# Patient Record
Sex: MALE | Race: WHITE | NOT HISPANIC OR LATINO | Employment: STUDENT | URBAN - METROPOLITAN AREA
[De-identification: names, ages, dates, MRNs, and addresses within clinical notes are randomized per-mention and may not be internally consistent; named-entity substitution may affect disease eponyms.]

---
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
## 2021-06-08 NOTE — PROGRESS NOTES
Daily Note     Today's date: 2018  Patient name: Giselle Garcia  : 2000  MRN: [id number]  Referring provider: Foreign Ovalle  Dx:   Encounter Diagnosis     ICD-10-CM    1  Sprain of ligament of right ankle, subsequent encounter S93 401D    2  Acute right ankle pain M25 571                   Subjective: Patient reported noticing a mild improvement in R ankle symptoms over the last two days  Objective: See treatment diary below  Updated home program       Assessment: No reported increase in symptoms during program  Progressed LE strengthening and stability program to address functional weakness and instability, providing cueing for form  Concluded treatment with modalities for pain control and swelling  Plan: Continue per plan of care  Progress treatment as tolerated           Precautions: none    Daily Treatment Diary   Manual             Ankle DF PROM Prn/as tolerated NV                                                                   Exercise Diary             Bike warm up NV 10 min           Standing gastroc block stretch :30x3 30"x3           Standing soleus block stretch :30x3 30"x3           Seated IV/EV med ball NV 10"x 10 ea           SL stance on foam NV  10"x 10            VG DL squats NV 40%  4 min           VG SL heel raises NV 40%  2x15           Standing ankle 4-ways NV NV                                                                                                                                                                           Modalities             H-wave (high, low ) /CP/elevation NV 10 min post                                       HEP: standing gastroc block stretch, standing soleus stretch, SL stance, seated INV/EV iso
0

## 2023-08-01 ENCOUNTER — APPOINTMENT (OUTPATIENT)
Dept: LAB | Facility: CLINIC | Age: 23
End: 2023-08-01
Payer: COMMERCIAL

## 2023-08-01 ENCOUNTER — OFFICE VISIT (OUTPATIENT)
Dept: FAMILY MEDICINE CLINIC | Facility: CLINIC | Age: 23
End: 2023-08-01
Payer: COMMERCIAL

## 2023-08-01 VITALS
SYSTOLIC BLOOD PRESSURE: 128 MMHG | WEIGHT: 249 LBS | DIASTOLIC BLOOD PRESSURE: 62 MMHG | HEIGHT: 70 IN | HEART RATE: 72 BPM | BODY MASS INDEX: 35.65 KG/M2

## 2023-08-01 DIAGNOSIS — Z13.1 SCREENING FOR DIABETES MELLITUS: ICD-10-CM

## 2023-08-01 DIAGNOSIS — K21.9 GASTROESOPHAGEAL REFLUX DISEASE, UNSPECIFIED WHETHER ESOPHAGITIS PRESENT: ICD-10-CM

## 2023-08-01 DIAGNOSIS — Z13.220 SCREENING FOR LIPID DISORDERS: ICD-10-CM

## 2023-08-01 DIAGNOSIS — Z13.29 SCREENING FOR THYROID DISORDER: ICD-10-CM

## 2023-08-01 DIAGNOSIS — G44.019 EPISODIC CLUSTER HEADACHE, NOT INTRACTABLE: ICD-10-CM

## 2023-08-01 DIAGNOSIS — J30.1 SEASONAL ALLERGIC RHINITIS DUE TO POLLEN: ICD-10-CM

## 2023-08-01 DIAGNOSIS — Z00.00 ANNUAL PHYSICAL EXAM: Primary | ICD-10-CM

## 2023-08-01 DIAGNOSIS — Q23.1 BICUSPID AORTIC VALVE: ICD-10-CM

## 2023-08-01 PROBLEM — J30.9 ALLERGIC RHINITIS: Status: ACTIVE | Noted: 2018-12-05

## 2023-08-01 LAB
ALBUMIN SERPL BCP-MCNC: 4.3 G/DL (ref 3.5–5)
ALP SERPL-CCNC: 82 U/L (ref 46–116)
ALT SERPL W P-5'-P-CCNC: 75 U/L (ref 12–78)
ANION GAP SERPL CALCULATED.3IONS-SCNC: 5 MMOL/L
AST SERPL W P-5'-P-CCNC: 34 U/L (ref 5–45)
BACTERIA UR QL AUTO: ABNORMAL /HPF
BASOPHILS # BLD AUTO: 0.04 THOUSANDS/ÂΜL (ref 0–0.1)
BASOPHILS NFR BLD AUTO: 1 % (ref 0–1)
BILIRUB SERPL-MCNC: 0.46 MG/DL (ref 0.2–1)
BILIRUB UR QL STRIP: NEGATIVE
BUN SERPL-MCNC: 13 MG/DL (ref 5–25)
CALCIUM SERPL-MCNC: 9.6 MG/DL (ref 8.3–10.1)
CHLORIDE SERPL-SCNC: 106 MMOL/L (ref 96–108)
CHOLEST SERPL-MCNC: 140 MG/DL
CLARITY UR: ABNORMAL
CO2 SERPL-SCNC: 28 MMOL/L (ref 21–32)
COLOR UR: ABNORMAL
CREAT SERPL-MCNC: 1.13 MG/DL (ref 0.6–1.3)
EOSINOPHIL # BLD AUTO: 0.23 THOUSAND/ÂΜL (ref 0–0.61)
EOSINOPHIL NFR BLD AUTO: 3 % (ref 0–6)
ERYTHROCYTE [DISTWIDTH] IN BLOOD BY AUTOMATED COUNT: 12.4 % (ref 11.6–15.1)
FERRITIN SERPL-MCNC: 51 NG/ML (ref 24–336)
GFR SERPL CREATININE-BSD FRML MDRD: 91 ML/MIN/1.73SQ M
GLUCOSE SERPL-MCNC: 101 MG/DL (ref 65–140)
GLUCOSE UR STRIP-MCNC: NEGATIVE MG/DL
HCT VFR BLD AUTO: 45.7 % (ref 36.5–49.3)
HDLC SERPL-MCNC: 49 MG/DL
HGB BLD-MCNC: 14.5 G/DL (ref 12–17)
HGB UR QL STRIP.AUTO: NEGATIVE
IMM GRANULOCYTES # BLD AUTO: 0.02 THOUSAND/UL (ref 0–0.2)
IMM GRANULOCYTES NFR BLD AUTO: 0 % (ref 0–2)
IRON SATN MFR SERPL: 20 % (ref 20–50)
IRON SERPL-MCNC: 73 UG/DL (ref 65–175)
KETONES UR STRIP-MCNC: NEGATIVE MG/DL
LDLC SERPL CALC-MCNC: 78 MG/DL (ref 0–100)
LEUKOCYTE ESTERASE UR QL STRIP: NEGATIVE
LYMPHOCYTES # BLD AUTO: 1.69 THOUSANDS/ÂΜL (ref 0.6–4.47)
LYMPHOCYTES NFR BLD AUTO: 22 % (ref 14–44)
MAGNESIUM SERPL-MCNC: 2.6 MG/DL (ref 1.6–2.6)
MCH RBC QN AUTO: 27.4 PG (ref 26.8–34.3)
MCHC RBC AUTO-ENTMCNC: 31.7 G/DL (ref 31.4–37.4)
MCV RBC AUTO: 86 FL (ref 82–98)
MONOCYTES # BLD AUTO: 0.54 THOUSAND/ÂΜL (ref 0.17–1.22)
MONOCYTES NFR BLD AUTO: 7 % (ref 4–12)
MUCOUS THREADS UR QL AUTO: ABNORMAL
NEUTROPHILS # BLD AUTO: 5.08 THOUSANDS/ÂΜL (ref 1.85–7.62)
NEUTS SEG NFR BLD AUTO: 67 % (ref 43–75)
NITRITE UR QL STRIP: NEGATIVE
NON-SQ EPI CELLS URNS QL MICRO: ABNORMAL /HPF
NRBC BLD AUTO-RTO: 0 /100 WBCS
PH UR STRIP.AUTO: 6.5 [PH]
PHOSPHATE SERPL-MCNC: 2.8 MG/DL (ref 2.7–4.5)
PLATELET # BLD AUTO: 353 THOUSANDS/UL (ref 149–390)
PMV BLD AUTO: 9.7 FL (ref 8.9–12.7)
POTASSIUM SERPL-SCNC: 4.2 MMOL/L (ref 3.5–5.3)
PROT SERPL-MCNC: 8.2 G/DL (ref 6.4–8.4)
PROT UR STRIP-MCNC: ABNORMAL MG/DL
RBC # BLD AUTO: 5.3 MILLION/UL (ref 3.88–5.62)
RBC #/AREA URNS AUTO: ABNORMAL /HPF
SODIUM SERPL-SCNC: 139 MMOL/L (ref 135–147)
SP GR UR STRIP.AUTO: 1.03 (ref 1–1.03)
TIBC SERPL-MCNC: 374 UG/DL (ref 250–450)
TRIGL SERPL-MCNC: 63 MG/DL
TSH SERPL DL<=0.05 MIU/L-ACNC: 2.04 UIU/ML (ref 0.45–4.5)
UROBILINOGEN UR STRIP-ACNC: <2 MG/DL
WBC # BLD AUTO: 7.6 THOUSAND/UL (ref 4.31–10.16)
WBC #/AREA URNS AUTO: ABNORMAL /HPF

## 2023-08-01 PROCEDURE — 84100 ASSAY OF PHOSPHORUS: CPT

## 2023-08-01 PROCEDURE — 80053 COMPREHEN METABOLIC PANEL: CPT

## 2023-08-01 PROCEDURE — 81001 URINALYSIS AUTO W/SCOPE: CPT

## 2023-08-01 PROCEDURE — 83550 IRON BINDING TEST: CPT

## 2023-08-01 PROCEDURE — 36415 COLL VENOUS BLD VENIPUNCTURE: CPT

## 2023-08-01 PROCEDURE — 85025 COMPLETE CBC W/AUTO DIFF WBC: CPT

## 2023-08-01 PROCEDURE — 99385 PREV VISIT NEW AGE 18-39: CPT | Performed by: NURSE PRACTITIONER

## 2023-08-01 PROCEDURE — 80061 LIPID PANEL: CPT

## 2023-08-01 PROCEDURE — 83735 ASSAY OF MAGNESIUM: CPT

## 2023-08-01 PROCEDURE — 83540 ASSAY OF IRON: CPT

## 2023-08-01 PROCEDURE — 82728 ASSAY OF FERRITIN: CPT

## 2023-08-01 PROCEDURE — 84443 ASSAY THYROID STIM HORMONE: CPT

## 2023-08-01 PROCEDURE — 99204 OFFICE O/P NEW MOD 45 MIN: CPT | Performed by: NURSE PRACTITIONER

## 2023-08-01 RX ORDER — PANTOPRAZOLE SODIUM 40 MG/1
40 TABLET, DELAYED RELEASE ORAL
Qty: 30 TABLET | Refills: 1 | Status: SHIPPED | OUTPATIENT
Start: 2023-08-01

## 2023-08-01 RX ORDER — NAPROXEN 500 MG/1
500 TABLET ORAL 2 TIMES DAILY PRN
Qty: 60 TABLET | Refills: 0 | Status: SHIPPED | OUTPATIENT
Start: 2023-08-01

## 2023-08-01 RX ORDER — FAMOTIDINE 20 MG/1
20 TABLET, FILM COATED ORAL
COMMUNITY

## 2023-08-01 RX ORDER — LORATADINE 10 MG/1
10 TABLET ORAL DAILY
COMMUNITY

## 2023-08-01 RX ORDER — FAMOTIDINE 40 MG/1
40 TABLET, FILM COATED ORAL DAILY
Qty: 90 TABLET | Refills: 0 | Status: CANCELLED | OUTPATIENT
Start: 2023-08-01

## 2023-08-01 NOTE — ASSESSMENT & PLAN NOTE
Repeat echocardiogram was ordered for reassessment of this. Patient does currently follow with a cardiologist at [facility].

## 2023-08-01 NOTE — ASSESSMENT & PLAN NOTE
Patient's symptoms are consistent with uncontrolled GERD. Patient was advised to continue Pepcid 20 mg daily but was advised to begin taking the medication at bedtime. Patient was also started on Protonix 40 mg daily prior to breakfast.  I did speak with patient about the pathophysiology of GERD and advised him to avoid common trigger foods. Patient was also advised to make a food diary to hopefully identify trigger foods for his GERD. Patient was also advised to make sure that he sits upright for at least an hour after eating to prevent exacerbation of his symptoms. I will have patient return to the office in 1 month to reassess his symptoms.

## 2023-08-01 NOTE — ASSESSMENT & PLAN NOTE
I feel that it is very likely that the patient's recurrent headaches are due to hypoglycemia caused by the long gaps that the patient takes in between meals. Patient was advised that he should at least be eating a small snack for breakfast and lunch to prevent these episodes of hypoglycemia. Patient was also advised to keep an adequate water intake to prevent dehydration. Labs were also ordered to assess for any signs of acute dehydration or electrolyte imbalances which could be contributing to recurrent headaches. Naproxen was also ordered to be used as needed up to 2 times per day to treat headaches.

## 2023-08-01 NOTE — PROGRESS NOTES
[facility]    NAME: Mis Araiza  AGE: 26 y.o. SEX: male  : 2000     DATE: 2023     Assessment and Plan:     Problem List Items Addressed This Visit        Digestive    Gastroesophageal reflux disease     Patient's symptoms are consistent with uncontrolled GERD. Patient was advised to continue Pepcid 20 mg daily but was advised to begin taking the medication at bedtime. Patient was also started on Protonix 40 mg daily prior to breakfast.  I did speak with patient about the pathophysiology of GERD and advised him to avoid common trigger foods. Patient was also advised to make a food diary to hopefully identify trigger foods for his GERD. Patient was also advised to make sure that he sits upright for at least an hour after eating to prevent exacerbation of his symptoms. I will have patient return to the office in 1 month to reassess his symptoms. Relevant Medications    famotidine (PEPCID) 20 mg tablet    pantoprazole (PROTONIX) 40 mg tablet       Respiratory    Allergic rhinitis     Well-controlled with as needed use of Claritin. Relevant Medications    naproxen (Naprosyn) 500 mg tablet       Cardiovascular and Mediastinum    Bicuspid aortic valve     Repeat echocardiogram was ordered for reassessment of this. Patient does currently follow with a cardiologist at [facility]. Relevant Orders    Echo complete w/ contrast if indicated       Nervous and Auditory    Episodic cluster headache, not intractable     I feel that it is very likely that the patient's recurrent headaches are due to hypoglycemia caused by the long gaps that the patient takes in between meals. Patient was advised that he should at least be eating a small snack for breakfast and lunch to prevent these episodes of hypoglycemia. Patient was also advised to keep an adequate water intake to prevent dehydration.   Labs were also ordered to assess for any signs of acute dehydration or electrolyte imbalances which could be contributing to recurrent headaches. Naproxen was also ordered to be used as needed up to 2 times per day to treat headaches. Relevant Medications    naproxen (Naprosyn) 500 mg tablet    Other Relevant Orders    CBC and differential    Comprehensive metabolic panel    Iron Panel (Includes Ferritin, Iron Sat%, Iron, and TIBC)    Magnesium    Phosphorus   Other Visit Diagnoses     Annual physical exam    -  Primary    Screening for thyroid disorder        Relevant Orders    TSH, 3rd generation    Screening for lipid disorders        Relevant Orders    Lipid Panel with Direct LDL reflex    Screening for diabetes mellitus        Relevant Orders    UA w Reflex to Microscopic w Reflex to Culture -Lab Collect          Immunizations and preventive care screenings were discussed with patient today. Appropriate education was printed on patient's after visit summary. Counseling:  Alcohol/drug use: discussed moderation in alcohol intake, the recommendations for healthy alcohol use, and avoidance of illicit drug use. Dental Health: discussed importance of regular tooth brushing, flossing, and dental visits. Injury prevention: discussed safety/seat belts, safety helmets, smoke detectors, carbon dioxide detectors, and smoking near bedding or upholstery. Sexual health: discussed sexually transmitted diseases, partner selection, use of condoms, avoidance of unintended pregnancy, and contraceptive alternatives. Exercise: the importance of regular exercise/physical activity was discussed. Recommend exercise 3-5 times per week for at least 30 minutes. BMI Counseling: Body mass index is 35.73 kg/m². The BMI is above normal. Nutrition recommendations include decreasing portion sizes, encouraging healthy choices of fruits and vegetables, moderation in carbohydrate intake and increasing intake of lean protein.  Exercise recommendations include exercising 3-5 times per week and obtaining a gym membership. No pharmacotherapy was ordered. Rationale for BMI follow-up plan is due to patient being overweight or obese. Depression Screening and Follow-up Plan: Patient was screened for depression during today's encounter. They screened negative with a PHQ-2 score of 0. Return in about 4 weeks (around 8/29/2023) for Recheck. Chief Complaint:     Chief Complaint   Patient presents with   • Headache     Pt c/o headaches on theright side of head off and on x 10 days. • GERD     Pt c/o food getting stuck in throat at times makes pt jeffry. History of Present Illness:     Adult Annual Physical   Patient here for a comprehensive physical exam. The patient reports no problems. Allergic rhinitis: Well-controlled with as needed use of Claritin. Headaches: Patient reports that he has been having recurrent right-sided headaches over the past 10 days. The patient reports that these headaches have been occurring daily and last for about 30 minutes. He denies any associated neurological changes. Patient does report that he frequently goes long periods of time without eating and often eats his first meal at 9 PM each night. Patient does report that his headaches seem to improve after he does eat. GERD: Patient reports that he has been having recurrent episodes of a globus sensation over the past few weeks. The patient reports that frequently after eating he feels as though food is caught in his throat and he does regurgitate some of his food at times. Patient does report that he frequently eats late at night and often goes to bed right after eating. Patient did recently begin Pepcid 20 mg daily and he reports this did somewhat improve his symptoms but has not resolved the symptoms.     Bicuspid aortic valve: Patient's most recent echocardiogram was completed in 2019 and it showed a bicuspid aortic valve with fusion of the right and left coronary cusps. No aortic valve regurgitation or stenosis was noted. Normal left and right ventricular size were also noted. The patient denies any new cardiac symptoms. The patient is requesting that a repeat echocardigram be ordered for monitoring of his condition. Diet and Physical Activity  Diet/Nutrition: well balanced diet and consuming 3-5 servings of fruits/vegetables daily. Exercise: walking, 3-4 times a week on average and 30-60 minutes on average. Depression Screening  PHQ-2/9 Depression Screening    Little interest or pleasure in doing things: 0 - not at all  Feeling down, depressed, or hopeless: 0 - not at all  PHQ-2 Score: 0  PHQ-2 Interpretation: Negative depression screen       General Health  Sleep: sleeps well and gets 7-8 hours of sleep on average. Hearing: normal - bilateral.  Vision: no vision problems, goes for regular eye exams and most recent eye exam <1 year ago. Dental: regular dental visits, brushes teeth twice daily and flosses teeth occasionally.  Health  History of STDs?: no.     Review of Systems:     Review of Systems   Constitutional: Negative for chills and fever. HENT: Negative for ear pain and sore throat. Eyes: Negative for pain and visual disturbance. Respiratory: Negative for cough, chest tightness, shortness of breath and wheezing. Cardiovascular: Negative for chest pain, palpitations and leg swelling. Gastrointestinal: Negative for abdominal pain, constipation, diarrhea, nausea and vomiting. Recurrent globus sensation   Endocrine: Negative for cold intolerance and heat intolerance. Genitourinary: Negative for decreased urine volume, dysuria and hematuria. Musculoskeletal: Negative for arthralgias, back pain and myalgias. Skin: Negative for color change and rash. Allergic/Immunologic: Positive for environmental allergies. Neurological: Positive for headaches (recurrent headaches ).  Negative for dizziness, seizures, syncope, weakness, light-headedness and numbness. Hematological: Negative for adenopathy. Psychiatric/Behavioral: Negative for confusion. The patient is not nervous/anxious. All other systems reviewed and are negative. Past Medical History:     Past Medical History:   Diagnosis Date   • Bicuspid aortic valve       Past Surgical History:     Past Surgical History:   Procedure Laterality Date   • MOLE REMOVAL      on lip      Social History:     Social History     Socioeconomic History   • Marital status: Single     Spouse name: None   • Number of children: None   • Years of education: None   • Highest education level: None   Occupational History   • None   Tobacco Use   • Smoking status: Never   • Smokeless tobacco: Never   • Tobacco comments:     Pt does Vapping   Substance and Sexual Activity   • Alcohol use: None   • Drug use: None   • Sexual activity: None   Other Topics Concern   • None   Social History Narrative   • None     Social Determinants of Health     Financial Resource Strain: Not on file   Food Insecurity: Not on file   Transportation Needs: Not on file   Physical Activity: Not on file   Stress: Not on file   Social Connections: Not on file   Intimate Partner Violence: Not on file   Housing Stability: Not on file      Family History:     History reviewed. No pertinent family history. Current Medications:     Current Outpatient Medications   Medication Sig Dispense Refill   • famotidine (PEPCID) 20 mg tablet Take 20 mg by mouth daily at bedtime     • naproxen (Naprosyn) 500 mg tablet Take 1 tablet (500 mg total) by mouth 2 (two) times a day as needed for headaches 60 tablet 0   • pantoprazole (PROTONIX) 40 mg tablet Take 1 tablet (40 mg total) by mouth daily before breakfast 30 tablet 1   • loratadine (CLARITIN) 10 mg tablet Take 10 mg by mouth daily       No current facility-administered medications for this visit.       Allergies:     No Known Allergies   Physical Exam:     /62 Pulse 72   Ht 5' 10" (1.778 m)   Wt 113 kg (249 lb)   BMI 35.73 kg/m²     Physical Exam  Vitals and nursing note reviewed. Constitutional:       General: He is not in acute distress. Appearance: Normal appearance. He is well-developed. He is not ill-appearing. HENT:      Head: Normocephalic. Eyes:      Conjunctiva/sclera: Conjunctivae normal.   Cardiovascular:      Rate and Rhythm: Normal rate and regular rhythm. Pulses: Normal pulses. Carotid pulses are 2+ on the right side and 2+ on the left side. Radial pulses are 2+ on the right side and 2+ on the left side. Posterior tibial pulses are 2+ on the right side and 2+ on the left side. Heart sounds: Normal heart sounds. No murmur heard. Pulmonary:      Effort: Pulmonary effort is normal. No respiratory distress. Breath sounds: Normal breath sounds. No decreased breath sounds, wheezing, rhonchi or rales. Abdominal:      General: Abdomen is flat. Bowel sounds are normal. There is no distension. Palpations: Abdomen is soft. Tenderness: There is no abdominal tenderness. There is no guarding. Musculoskeletal:         General: No swelling. Normal range of motion. Cervical back: Normal range of motion and neck supple. Right lower leg: No edema. Left lower leg: No edema. Skin:     General: Skin is warm and dry. Capillary Refill: Capillary refill takes less than 2 seconds. Neurological:      General: No focal deficit present. Mental Status: He is alert and oriented to person, place, and time. Psychiatric:         Mood and Affect: Mood normal.         Behavior: Behavior normal.         Thought Content:  Thought content normal.         Judgment: Judgment normal.          STEPHANIE Keller   [facility]

## 2023-08-01 NOTE — PROGRESS NOTES
Name: Ed Sands      : 2000      MRN: [id number]  Encounter Provider: STEPHANIE Werner  Encounter Date: 2023   Encounter department: [address] [facility] [address]     {There are no diagnoses linked to this encounter.  (Refresh or delete this SmartLink)}       Subjective      HPI  Review of Systems    Current Outpatient Medications on File Prior to Visit   Medication Sig   • loratadine (CLARITIN) 10 mg tablet Take 10 mg by mouth daily       Objective     /62   Pulse 72   Ht 5' 10" (1.778 m)   Wt 113 kg (249 lb)   BMI 35.73 kg/m²     Physical Exam  STEPHANIE Werner

## 2023-08-02 ENCOUNTER — TELEPHONE (OUTPATIENT)
Dept: FAMILY MEDICINE CLINIC | Facility: CLINIC | Age: 23
End: 2023-08-02

## 2023-08-25 ENCOUNTER — RA CDI HCC (OUTPATIENT)
Dept: OTHER | Facility: HOSPITAL | Age: 23
End: 2023-08-25

## 2023-08-25 NOTE — PROGRESS NOTES
[address] [facility] coding opportunities       Chart reviewed, no opportunity found: CHART REVIEWED, NO OPPORTUNITY FOUND        Patients Insurance        Commercial Insurance: [employer]

## 2023-08-31 ENCOUNTER — OFFICE VISIT (OUTPATIENT)
Dept: FAMILY MEDICINE CLINIC | Facility: CLINIC | Age: 23
End: 2023-08-31

## 2023-08-31 VITALS
TEMPERATURE: 98.7 F | WEIGHT: 246.13 LBS | SYSTOLIC BLOOD PRESSURE: 120 MMHG | DIASTOLIC BLOOD PRESSURE: 80 MMHG | HEART RATE: 70 BPM | BODY MASS INDEX: 35.32 KG/M2 | OXYGEN SATURATION: 97 %

## 2023-08-31 DIAGNOSIS — U07.1 COVID-19 VIRUS INFECTION: Primary | ICD-10-CM

## 2023-08-31 DIAGNOSIS — K21.9 GASTROESOPHAGEAL REFLUX DISEASE, UNSPECIFIED WHETHER ESOPHAGITIS PRESENT: Primary | ICD-10-CM

## 2023-08-31 DIAGNOSIS — K21.9 GASTROESOPHAGEAL REFLUX DISEASE, UNSPECIFIED WHETHER ESOPHAGITIS PRESENT: ICD-10-CM

## 2023-08-31 DIAGNOSIS — J02.9 SORE THROAT: ICD-10-CM

## 2023-08-31 DIAGNOSIS — R09.81 CONGESTED NOSE: ICD-10-CM

## 2023-08-31 LAB
SARS-COV-2 AG UPPER RESP QL IA: POSITIVE
VALID CONTROL: ABNORMAL

## 2023-08-31 RX ORDER — FAMOTIDINE 40 MG/1
40 TABLET, FILM COATED ORAL
Qty: 30 TABLET | Refills: 2 | Status: SHIPPED | OUTPATIENT
Start: 2023-08-31

## 2023-08-31 RX ORDER — SUCRALFATE ORAL 1 G/10ML
1 SUSPENSION ORAL
Qty: 414 ML | Refills: 4 | Status: SHIPPED | OUTPATIENT
Start: 2023-08-31 | End: 2023-09-01

## 2023-08-31 NOTE — ASSESSMENT & PLAN NOTE
Patient's GERD symptoms have not improved much therefore, he was advised to continue Protonix 40 mg daily prior to breakfast and he will also be started on Carafate 3 times daily before meals. His Pepcid dosage was also increased to 40 mg at bedtime. Amylase and lipase levels were ordered to assess for any signs of pancreatitis. H. pylori stool sample was also ordered to be completed. GI referral was also placed so that patient can be scheduled for endoscopy. I also reinforced the importance of limiting fatty and fried foods in his diet.

## 2023-08-31 NOTE — ASSESSMENT & PLAN NOTE
Patient was advised to continue to quarantine through the rest of today and if he remains afebrile over the next 24 hours without the use of antipyretics he may return from isolation starting tomorrow and begin to mask for 5 days when in public and when around others. Patient was advised to call the office for any worsening symptoms.

## 2023-08-31 NOTE — PROGRESS NOTES
COVID-19 Outpatient Progress Note    Assessment/Plan:    Problem List Items Addressed This Visit        Digestive    Gastroesophageal reflux disease     Patient's GERD symptoms have not improved much therefore, he was advised to continue Protonix 40 mg daily prior to breakfast and he will also be started on Carafate 3 times daily before meals. His Pepcid dosage was also increased to 40 mg at bedtime. Amylase and lipase levels were ordered to assess for any signs of pancreatitis. H. pylori stool sample was also ordered to be completed. GI referral was also placed so that patient can be scheduled for endoscopy. I also reinforced the importance of limiting fatty and fried foods in his diet. Relevant Medications    famotidine (PEPCID) 40 MG tablet    sucralfate (CARAFATE) 1 g/10 mL suspension    Other Relevant Orders    Ambulatory Referral to Gastroenterology    H. pylori antigen, stool    Amylase    Lipase       Other    COVID-19 virus infection - Primary     Patient was advised to continue to quarantine through the rest of today and if he remains afebrile over the next 24 hours without the use of antipyretics he may return from isolation starting tomorrow and begin to mask for 5 days when in public and when around others. Patient was advised to call the office for any worsening symptoms. Relevant Orders    POCT Rapid Covid Ag (Completed)   Other Visit Diagnoses     Sore throat        Relevant Orders    POCT Rapid Covid Ag (Completed)    Congested nose        Relevant Orders    POCT Rapid Covid Ag (Completed)         Disposition:     Patient has asymptomatic or mild COVID-19 infection. Based off CDC guidelines, they were recommended to isolate for 5 days. If they are asymptomatic or symptoms are improving with no fevers in the past 24 hours, isolation may be ended followed by 5 days of wearing a mask when around othes to minimize risk of infecting others.  If still have a fever or other symptoms have not improved, continue to isolate until they improve. Regardless of when they end isolation, avoid being around people who are more likely to get very sick from COVID-19 until at least day 11. Discussed symptom directed medication options with patient. Patient tested positive for COVID in the office today. I have spent a total time of 20 minutes on the day of the encounter for this patient including       Encounter provider: STEPHANIE Bailey     Provider located at: [address] [address] [phone]     Recent Visits  No visits were found meeting these conditions. Showing recent visits within past 7 days and meeting all other requirements  Today's Visits  Date Type Provider Dept   08/31/23 Office Visit Juan Miguel Hammer, [address] [facility]   Showing today's visits and meeting all other requirements  Future Appointments  No visits were found meeting these conditions. Showing future appointments within next 150 days and meeting all other requirements     Subjective:   Shannan Sigala is a 26 y.o. male who is concerned about COVID-19. Patient's symptoms include fatigue, malaise, nasal congestion, rhinorrhea, sore throat, cough (non-productive ), nausea (after eating), vomiting (after eating), myalgias and headache. Patient denies fever, chills, anosmia, loss of taste, shortness of breath, chest tightness, abdominal pain and diarrhea.      - Date of symptom onset: 8/27/2023      COVID-19 vaccination status: Not vaccinated    Exposure:   Contact with a person who is under investigation (PUI) for or who is positive for COVID-19 within the last 14 days?: No    Hospitalized recently for fever and/or lower respiratory symptoms?: No      Currently a healthcare worker that is involved in direct patient care?: No      Works in a special setting where the risk of COVID-19 transmission may be high? (this may include long-term care, correctional and care home facilities; homeless shelters; assisted-living facilities and group homes.): No      Resident in a special setting where the risk of COVID-19 transmission may be high? (this may include long-term care, correctional and care home facilities; homeless shelters; assisted-living facilities and group homes.): No      Patient tested positive for COVID via rapid test in the office today. Patient is reporting symptoms of intermittent nonproductive cough, rhinorrhea, nasal congestion, sore throat, headaches, myalgias, and increased fatigue. Patient denies any fevers or shortness of breath. Since patient is on his fifth day of symptoms and his symptoms are generally mild I did not feel that oral COVID medications were warranted at this point. Patient was agreeable to this as well. GERD: At patient's last office visit he was started on Protonix 40 mg daily prior to breakfast and advised to continue Pepcid 20 mg at bedtime. Patient reports that his GERD symptoms have not improved. Patient reports that he frequently feels as though he needs to vomit after eating and oftentimes does vomit up food shortly after eating. He reports he has not noted much improvement in his symptoms but he has not been changing his diet very much. He reports that he is still eating fast foods and fried foods frequently. Lab Results   Component Value Date    SARSCORONAVI Not Detected 02/27/2022    SARSCOVAG Positive (A) 08/31/2023       Review of Systems   Constitutional: Positive for fatigue. Negative for chills and fever. HENT: Positive for congestion, rhinorrhea and sore throat. Eyes: Negative. Respiratory: Positive for cough (non-productive ). Negative for chest tightness and shortness of breath. Cardiovascular: Negative. Gastrointestinal: Positive for nausea (after eating) and vomiting (after eating). Negative for abdominal pain, constipation and diarrhea.         GERD symptoms   Endocrine: Negative. Genitourinary: Negative. Musculoskeletal: Positive for myalgias. Skin: Negative. Allergic/Immunologic: Negative. Neurological: Positive for headaches. Hematological: Negative. Psychiatric/Behavioral: Negative. Current Outpatient Medications on File Prior to Visit   Medication Sig   • loratadine (CLARITIN) 10 mg tablet Take 10 mg by mouth daily   • naproxen (Naprosyn) 500 mg tablet Take 1 tablet (500 mg total) by mouth 2 (two) times a day as needed for headaches   • pantoprazole (PROTONIX) 40 mg tablet Take 1 tablet (40 mg total) by mouth daily before breakfast   • [DISCONTINUED] famotidine (PEPCID) 20 mg tablet Take 20 mg by mouth daily at bedtime       Objective:    /80 (BP Location: Right arm, Patient Position: Sitting, Cuff Size: Large)   Pulse 70   Temp 98.7 °F (37.1 °C) (Temporal)   Wt 112 kg (246 lb 2 oz)   SpO2 97%   BMI 35.32 kg/m²        Physical Exam  Vitals and nursing note reviewed. Constitutional:       General: He is not in acute distress. Appearance: Normal appearance. He is well-developed. He is not ill-appearing. HENT:      Head: Normocephalic. Eyes:      Conjunctiva/sclera: Conjunctivae normal.   Cardiovascular:      Rate and Rhythm: Normal rate and regular rhythm. Pulses: Normal pulses. Carotid pulses are 2+ on the right side and 2+ on the left side. Radial pulses are 2+ on the right side and 2+ on the left side. Posterior tibial pulses are 2+ on the right side and 2+ on the left side. Heart sounds: Normal heart sounds. No murmur heard. Pulmonary:      Effort: Pulmonary effort is normal. No respiratory distress. Breath sounds: Normal breath sounds. No decreased breath sounds, wheezing, rhonchi or rales. Abdominal:      General: Abdomen is flat. Bowel sounds are normal. There is no distension. Palpations: Abdomen is soft. Tenderness: There is no abdominal tenderness. There is no guarding. Negative signs include Griffith's sign. Musculoskeletal:         General: No swelling. Normal range of motion. Cervical back: Normal range of motion and neck supple. Right lower leg: No edema. Left lower leg: No edema. Skin:     General: Skin is warm and dry. Capillary Refill: Capillary refill takes less than 2 seconds. Neurological:      General: No focal deficit present. Mental Status: He is alert and oriented to person, place, and time. Psychiatric:         Mood and Affect: Mood normal.         Behavior: Behavior normal.         Thought Content:  Thought content normal.         Judgment: Judgment normal.       STEPHANIE Headley

## 2023-08-31 NOTE — PROGRESS NOTES
Name: Farhan Estrella      : 2000      MRN: [id number]  Encounter Provider: STEPHANIE Marina  Encounter Date: 2023   Encounter department: [place] [address]     1. Sore throat  -     POCT Rapid Covid Ag    2.  Congested nose  -     POCT Rapid Covid Ag           Subjective      HPI  Review of Systems    Current Outpatient Medications on File Prior to Visit   Medication Sig   • famotidine (PEPCID) 20 mg tablet Take 20 mg by mouth daily at bedtime   • loratadine (CLARITIN) 10 mg tablet Take 10 mg by mouth daily   • naproxen (Naprosyn) 500 mg tablet Take 1 tablet (500 mg total) by mouth 2 (two) times a day as needed for headaches   • pantoprazole (PROTONIX) 40 mg tablet Take 1 tablet (40 mg total) by mouth daily before breakfast       Objective     /80 (BP Location: Right arm, Patient Position: Sitting, Cuff Size: Large)   Pulse 70   Temp 98.7 °F (37.1 °C) (Temporal)   Wt 112 kg (246 lb 2 oz)   SpO2 97%   BMI 35.32 kg/m²     Physical Exam  STEPHANIE Marina

## 2023-08-31 NOTE — TELEPHONE ENCOUNTER
[facility] [email or web address] [facility] called advising patient insurance does not cover sucralfate 1 g it will cost the patient out of pocket $100. An alternative medication is being requested.

## 2023-09-01 DIAGNOSIS — K21.9 GASTROESOPHAGEAL REFLUX DISEASE, UNSPECIFIED WHETHER ESOPHAGITIS PRESENT: ICD-10-CM

## 2023-09-01 RX ORDER — PANTOPRAZOLE SODIUM 40 MG/1
40 TABLET, DELAYED RELEASE ORAL
Qty: 30 TABLET | Refills: 0 | OUTPATIENT
Start: 2023-09-01

## 2023-09-01 RX ORDER — SUCRALFATE 1 G/1
1 TABLET ORAL 3 TIMES DAILY
Qty: 90 TABLET | Refills: 3 | Status: SHIPPED | OUTPATIENT
Start: 2023-09-01

## 2023-09-01 RX ORDER — PANTOPRAZOLE SODIUM 40 MG/1
TABLET, DELAYED RELEASE ORAL
Qty: 30 TABLET | Refills: 0 | Status: SHIPPED | OUTPATIENT
Start: 2023-09-01

## 2023-09-01 NOTE — TELEPHONE ENCOUNTER
Spoke to [facility] he advises that he wont be able to say until he receives a script for the tablet and submits it for billing through the insurance.

## 2023-10-03 DIAGNOSIS — K21.9 GASTROESOPHAGEAL REFLUX DISEASE, UNSPECIFIED WHETHER ESOPHAGITIS PRESENT: ICD-10-CM

## 2023-10-03 RX ORDER — PANTOPRAZOLE SODIUM 40 MG/1
40 TABLET, DELAYED RELEASE ORAL DAILY
Qty: 30 TABLET | Refills: 5 | Status: SHIPPED | OUTPATIENT
Start: 2023-10-03

## 2023-10-03 RX ORDER — FAMOTIDINE 40 MG/1
40 TABLET, FILM COATED ORAL
Qty: 30 TABLET | Refills: 5 | Status: SHIPPED | OUTPATIENT
Start: 2023-10-03

## 2023-10-19 ENCOUNTER — CONSULT (OUTPATIENT)
Dept: GASTROENTEROLOGY | Facility: MEDICAL CENTER | Age: 23
End: 2023-10-19

## 2023-10-19 VITALS
DIASTOLIC BLOOD PRESSURE: 78 MMHG | BODY MASS INDEX: 36.96 KG/M2 | TEMPERATURE: 96.4 F | WEIGHT: 257.6 LBS | SYSTOLIC BLOOD PRESSURE: 118 MMHG | HEART RATE: 69 BPM

## 2023-10-19 DIAGNOSIS — R11.2 NAUSEA AND VOMITING, UNSPECIFIED VOMITING TYPE: ICD-10-CM

## 2023-10-19 DIAGNOSIS — R11.10 REGURGITATION OF FOOD: Primary | ICD-10-CM

## 2023-10-19 DIAGNOSIS — R74.01 ELEVATED ALANINE AMINOTRANSFERASE (ALT) LEVEL: ICD-10-CM

## 2023-10-19 DIAGNOSIS — K21.9 GASTROESOPHAGEAL REFLUX DISEASE, UNSPECIFIED WHETHER ESOPHAGITIS PRESENT: ICD-10-CM

## 2023-10-19 RX ORDER — OMEPRAZOLE 40 MG/1
40 CAPSULE, DELAYED RELEASE ORAL DAILY
Qty: 30 CAPSULE | Refills: 11 | Status: SHIPPED | OUTPATIENT
Start: 2023-10-19

## 2023-10-19 NOTE — PROGRESS NOTES
[facility] - Outpatient Consultation  Farhan Estrella 26 y.o. male MRN: [id number]  Encounter: [id number]          ASSESSMENT AND PLAN:    81-year-old male with BMI 37 referred for vomiting/regurgitation. Symptoms initially suggestive of GERD with liquid regurgitation however he has been on aggressive acid suppression with improvement in liquid regurgitation but persistent solid food regurgitation/vomiting. Considerations include GERD, EOE, gastroparesis, cyclic vomiting, dietary intolerance, PUD, H pylori, hiatal hernia. Will escalate acid suppression, check EGD and if EGD unrevealing, get gastric emptying. 1. Gastroesophageal reflux disease, unspecified whether esophagitis present  2. Regurgitation of food  3. Nausea and vomiting, unspecified vomiting type  - Ambulatory Referral to Gastroenterology  - omeprazole (PriLOSEC) 40 MG capsule; Take 1 capsule (40 mg total) by mouth daily  Dispense: 30 capsule; Refill: 11  - EGD; Future. Recommended patient only have clear liquids for dinner before EGD to ensure empty stomach    4. Elevated alanine aminotransferase (ALT) level  5. BMI 36.0-36.9,adult  ALT elevated at 75 and BMI makes him at risk for fatty liver. Will get US to assess for steatosis  - US right upper quadrant; Future          ______________________________________________________________________    HPI:    In July, was getting liquid regurgitation 90% of the time he eat. In September, started carafate, pantoprazole, and famotidine so liquid stopped but now having solid food come up. Happens 30-60 minutes after eating. Usually effortless regurgitation. Happening with 80% of eating.  +globus sensation. No dysphagia. Rare classic heartburn symptoms, no epigastric pain. No regular NSAID use. No weight loss. Rare alcohol. No family history of liver problems.     Reviewed 8/1/23 ALT 75, CMP, CBC, TSH, iron panel otherwise unremarkable    REVIEW OF SYSTEMS:    CONSTITUTIONAL: Denies any fever, chills, rigors, and weight loss. HEENT: No earache or tinnitus. Denies hearing loss or visual disturbances. CARDIOVASCULAR: No chest pain or palpitations. RESPIRATORY: Denies any cough, hemoptysis, shortness of breath or dyspnea on exertion. GASTROINTESTINAL: As noted in the History of Present Illness. GENITOURINARY: No problems with urination. Denies any hematuria or dysuria. NEUROLOGIC: No dizziness or vertigo, denies headaches. MUSCULOSKELETAL: Denies any muscle or joint pain. SKIN: Denies skin rashes or itching. ENDOCRINE: Denies excessive thirst. Denies intolerance to heat or cold. PSYCHOSOCIAL: Denies depression or anxiety. Denies any recent memory loss. Historical Information   Past Medical History:   Diagnosis Date    Bicuspid aortic valve      Past Surgical History:   Procedure Laterality Date    MOLE REMOVAL      on lip     Social History   Social History     Substance and Sexual Activity   Alcohol Use None     Social History     Substance and Sexual Activity   Drug Use Not on file     Social History     Tobacco Use   Smoking Status Never   Smokeless Tobacco Never   Tobacco Comments    Pt does Vapping     History reviewed. No pertinent family history. Meds/Allergies       Current Outpatient Medications:     famotidine (PEPCID) 40 MG tablet    loratadine (CLARITIN) 10 mg tablet    pantoprazole (PROTONIX) 40 mg tablet    sucralfate (CARAFATE) 1 g tablet    naproxen (Naprosyn) 500 mg tablet    No Known Allergies        Objective     Blood pressure 118/78, pulse 69, temperature (!) 96.4 °F (35.8 °C), weight 117 kg (257 lb 9.6 oz). Body mass index is 36.96 kg/m². PHYSICAL EXAM:      General Appearance:   Alert, cooperative, no distress   HEENT:   Normocephalic, atraumatic, anicteric.      Neck:  Supple, symmetrical, trachea midline   Lungs:   Clear to auscultation bilaterally; no rales, rhonchi or wheezing; respirations unlabored    Heart[de-identified]   Regular rate and rhythm; no murmur, rub, or gallop. Abdomen:   Soft, non-tender, non-distended; normal bowel sounds; no masses, no organomegaly    Genitalia:   Deferred    Rectal:   Deferred    Extremities:  No cyanosis, clubbing or edema    Pulses:  2+ and symmetric    Skin:  No jaundice, rashes, or lesions    Lymph nodes:  No palpable cervical lymphadenopathy        Lab Results:   No visits with results within 1 Day(s) from this visit. Latest known visit with results is:   Office Visit on 08/31/2023   Component Date Value    POCT SARS-CoV-2 Ag 08/31/2023 Positive (A)     VALID CONTROL 08/31/2023 Valid          Radiology Results:   No results found.

## 2023-10-19 NOTE — PATIENT INSTRUCTIONS
The night before your EGD, only have clear liquids (juice, broth, jello, popsicles) and NO solid food after 4PM and then nothing to eat or drink after midnight.

## 2023-10-19 NOTE — PROGRESS NOTES
ASC Screening    ASC Screening  BMI > than 45: No  Are you currently pregnant?: No  Do you rely on a wheelchair for mobility?: No  Do you need oxygen during the day?: No  Have you ever been informed by anesthesia that you have a difficult airway?: No  Have you been diagnosed with End Stage Renal Disease (ESRD)?: No  Are you actively on dialysis?: No  Have you been diagnosed with Pulmonary Hypertension?: No  Do you have a pacemaker or an Automatic Implantable Cardioverter Defibrillator (AICD)?: No  Have you ever had an organ transplant?: No  Have you had a stroke, heart attack, myocardial infarction (MI) within the last 6 months?: No  Have you ever been diagnosed with Aortic Stenosis?: Yes  Have you ever been diagnosed  with Congestive Heart Failure?: No  Have you ever been diagnosed with a heart valve disease?: Yes  Are you Diabetic?: No  If you are Diabetic, has your A1C been greater than 12 within the last six months?: N/A

## 2023-10-23 ENCOUNTER — HOSPITAL ENCOUNTER (OUTPATIENT)
Dept: ULTRASOUND IMAGING | Facility: HOSPITAL | Age: 23
Discharge: HOME | End: 2023-10-23
Attending: STUDENT IN AN ORGANIZED HEALTH CARE EDUCATION/TRAINING PROGRAM
Payer: COMMERCIAL

## 2023-10-23 DIAGNOSIS — R74.01 ELEVATED ALANINE AMINOTRANSFERASE (ALT) LEVEL: ICD-10-CM

## 2023-10-23 PROCEDURE — 76705 ECHO EXAM OF ABDOMEN: CPT

## 2023-10-30 ENCOUNTER — OFFICE VISIT (OUTPATIENT)
Dept: FAMILY MEDICINE CLINIC | Facility: CLINIC | Age: 23
End: 2023-10-30
Payer: COMMERCIAL

## 2023-10-30 VITALS
HEART RATE: 66 BPM | TEMPERATURE: 98.1 F | DIASTOLIC BLOOD PRESSURE: 80 MMHG | BODY MASS INDEX: 36.88 KG/M2 | OXYGEN SATURATION: 98 % | WEIGHT: 257.6 LBS | SYSTOLIC BLOOD PRESSURE: 110 MMHG | HEIGHT: 70 IN

## 2023-10-30 DIAGNOSIS — K76.0 HEPATIC STEATOSIS: ICD-10-CM

## 2023-10-30 DIAGNOSIS — K21.9 GASTROESOPHAGEAL REFLUX DISEASE, UNSPECIFIED WHETHER ESOPHAGITIS PRESENT: Primary | ICD-10-CM

## 2023-10-30 PROBLEM — U07.1 COVID-19 VIRUS INFECTION: Status: RESOLVED | Noted: 2023-08-31 | Resolved: 2023-10-30

## 2023-10-30 PROCEDURE — 99214 OFFICE O/P EST MOD 30 MIN: CPT | Performed by: NURSE PRACTITIONER

## 2023-10-30 PROCEDURE — 3725F SCREEN DEPRESSION PERFORMED: CPT | Performed by: NURSE PRACTITIONER

## 2023-10-30 RX ORDER — PANTOPRAZOLE SODIUM 40 MG/1
40 TABLET, DELAYED RELEASE ORAL 2 TIMES DAILY
Qty: 60 TABLET | Refills: 3 | Status: SHIPPED | OUTPATIENT
Start: 2023-10-30

## 2023-10-30 RX ORDER — SUCRALFATE 1 G/1
1 TABLET ORAL 4 TIMES DAILY
Qty: 120 TABLET | Refills: 3 | Status: SHIPPED | OUTPATIENT
Start: 2023-10-30

## 2023-10-30 NOTE — PROGRESS NOTES
Name: Gudelia Reddy      : 2000      MRN: [id number]  Encounter Provider: STEPHANIE Sutherland  Encounter Date: 10/30/2023   Encounter department: [place] [address]     1. Gastroesophageal reflux disease, unspecified whether esophagitis present  Assessment & Plan:  Since patient's GERD symptoms are still not currently adequately controlled and he has had worsening headaches since beginning omeprazole the omeprazole was discontinued and he will be restarted on Protonix 40 mg twice daily. He was advised that the first dose of Protonix should be taken at least 30 minutes prior to breakfast.  I also discontinued his Pepcid as the second Protonix dosage will replace this at bedtime. Patient was also advised to begin taking Carafate 3 times daily at least 30 minutes prior to his meals and he will also begin taking a fourth dose at bedtime. Patient continues to follow with GI for this and does have upcoming EGD and gastric emptying study scheduled. Orders:  -     pantoprazole (PROTONIX) 40 mg tablet; Take 1 tablet (40 mg total) by mouth 2 (two) times a day  -     sucralfate (CARAFATE) 1 g tablet; Take 1 tablet (1 g total) by mouth 4 (four) times a day    2. Hepatic steatosis  Assessment & Plan:  Patient was advised that he should continue to limit fatty and fried foods in his diet. Depression Screening and Follow-up Plan: Patient was screened for depression during today's encounter. They screened negative with a PHQ-2 score of 0. Subjective      GERD: At patient's last office visit he was reporting worsening GERD symptoms so he was advised to continue Protonix 40 mg daily prior to breakfast and he was also started on Carafate 3 times daily before meals and his Pepcid dosage was increased to 40 mg at bedtime. Patient was referred to GI and was seen by them recently.   Per their note his Protonix was switched to omeprazole 40 mg daily and patient was scheduled for an EGD on 11/16/2023 and a gastric emptying study on 12/13/2023. Patient did recently have a right upper quadrant ultrasound completed due to an elevated ALT level which did show hepatic steatosis but was normal otherwise. The patient reports that since he was started on omeprazole he has been having worsening headaches. Patient does have a history of cluster headaches in the past but states that these have been much more frequent and noticeable since the Protonix was discontinued. Patient also denies noting any episodes of vomiting but he does still experience some regurgitation of food and liquids at times after eating. Overall, he feels that his symptoms have improved since beginning the medications but are not as improved as he would like. Review of Systems   Constitutional:  Negative for chills and fever. HENT:  Negative for ear pain and sore throat. Eyes:  Negative for pain and visual disturbance. Respiratory:  Negative for cough, chest tightness, shortness of breath and wheezing. Cardiovascular:  Negative for chest pain, palpitations and leg swelling. Gastrointestinal:  Negative for abdominal pain, constipation, diarrhea, nausea and vomiting. GERD symptoms   Endocrine: Negative for cold intolerance and heat intolerance. Genitourinary:  Negative for decreased urine volume, dysuria and hematuria. Musculoskeletal:  Negative for arthralgias, back pain and myalgias. Skin:  Negative for color change and rash. Allergic/Immunologic: Negative for environmental allergies. Neurological:  Negative for dizziness, seizures, syncope, weakness, light-headedness, numbness and headaches. Hematological:  Negative for adenopathy. Psychiatric/Behavioral:  Negative for confusion. The patient is not nervous/anxious. All other systems reviewed and are negative.       Current Outpatient Medications on File Prior to Visit   Medication Sig   • loratadine (CLARITIN) 10 mg tablet Take 10 mg by mouth daily   • [DISCONTINUED] famotidine (PEPCID) 40 MG tablet Take 1 tablet (40 mg total) by mouth daily at bedtime   • [DISCONTINUED] omeprazole (PriLOSEC) 40 MG capsule Take 1 capsule (40 mg total) by mouth daily   • [DISCONTINUED] sucralfate (CARAFATE) 1 g tablet Take 1 tablet (1 g total) by mouth 3 (three) times a day       Objective     /80 (BP Location: Right arm, Patient Position: Sitting, Cuff Size: Adult)   Pulse 66   Temp 98.1 °F (36.7 °C)   Ht 5' 10" (1.778 m)   Wt 117 kg (257 lb 9.6 oz)   SpO2 98%   BMI 36.96 kg/m²     Physical Exam  Vitals and nursing note reviewed. Constitutional:       General: He is not in acute distress. Appearance: Normal appearance. He is not ill-appearing. HENT:      Head: Normocephalic. Eyes:      Conjunctiva/sclera: Conjunctivae normal.   Cardiovascular:      Rate and Rhythm: Normal rate and regular rhythm. Pulses: Normal pulses. Carotid pulses are 2+ on the right side and 2+ on the left side. Radial pulses are 2+ on the right side and 2+ on the left side. Posterior tibial pulses are 2+ on the right side and 2+ on the left side. Heart sounds: Normal heart sounds. No murmur heard. Pulmonary:      Effort: Pulmonary effort is normal. No respiratory distress. Breath sounds: Normal breath sounds. No decreased breath sounds, wheezing, rhonchi or rales. Abdominal:      General: Abdomen is flat. Bowel sounds are normal. There is no distension. Palpations: Abdomen is soft. Tenderness: There is generalized abdominal tenderness. There is no guarding. Comments: Some slight generalized abdominal tenderness was noted with palpation throughout the abdomen. Musculoskeletal:         General: Normal range of motion. Cervical back: Normal range of motion. Right lower leg: No edema. Left lower leg: No edema. Skin:     General: Skin is warm and dry.       Capillary Refill: Capillary refill takes less than 2 seconds. Neurological:      General: No focal deficit present. Mental Status: He is alert and oriented to person, place, and time. Psychiatric:         Mood and Affect: Mood normal.         Behavior: Behavior normal.         Thought Content:  Thought content normal.         Judgment: Judgment normal.       Analisa CourserSTEPHANIE

## 2023-10-30 NOTE — ASSESSMENT & PLAN NOTE
Since patient's GERD symptoms are still not currently adequately controlled and he has had worsening headaches since beginning omeprazole the omeprazole was discontinued and he will be restarted on Protonix 40 mg twice daily. He was advised that the first dose of Protonix should be taken at least 30 minutes prior to breakfast.  I also discontinued his Pepcid as the second Protonix dosage will replace this at bedtime. Patient was also advised to begin taking Carafate 3 times daily at least 30 minutes prior to his meals and he will also begin taking a fourth dose at bedtime. Patient continues to follow with GI for this and does have upcoming EGD and gastric emptying study scheduled.

## 2023-10-31 ENCOUNTER — TELEPHONE (OUTPATIENT)
Dept: GASTROENTEROLOGY | Facility: MEDICAL CENTER | Age: 23
End: 2023-10-31

## 2023-10-31 NOTE — TELEPHONE ENCOUNTER
LVM attempting to confirm 11/16 procedure, informing that prep instructions sent via [employer] and that a  is needed.

## 2023-11-02 ENCOUNTER — ANESTHESIA EVENT (OUTPATIENT)
Dept: ANESTHESIOLOGY | Facility: HOSPITAL | Age: 23
End: 2023-11-02

## 2023-11-02 ENCOUNTER — ANESTHESIA EVENT (OUTPATIENT)
Dept: GASTROENTEROLOGY | Facility: HOSPITAL | Age: 23
End: 2023-11-02

## 2023-11-02 ENCOUNTER — ANESTHESIA (OUTPATIENT)
Dept: ANESTHESIOLOGY | Facility: HOSPITAL | Age: 23
End: 2023-11-02

## 2023-11-09 ENCOUNTER — HOSPITAL ENCOUNTER (OUTPATIENT)
Dept: NON INVASIVE DIAGNOSTICS | Facility: CLINIC | Age: 23
Discharge: HOME | End: 2023-11-09
Payer: COMMERCIAL

## 2023-11-09 VITALS
SYSTOLIC BLOOD PRESSURE: 110 MMHG | WEIGHT: 257 LBS | DIASTOLIC BLOOD PRESSURE: 80 MMHG | HEART RATE: 66 BPM | BODY MASS INDEX: 36.79 KG/M2 | HEIGHT: 70 IN

## 2023-11-09 DIAGNOSIS — Q23.1 BICUSPID AORTIC VALVE: ICD-10-CM

## 2023-11-09 LAB
AORTIC ROOT: 3.1 CM
APICAL FOUR CHAMBER EJECTION FRACTION: 61 %
ASCENDING AORTA: 3.5 CM
AV MEAN GRADIENT: 7 MMHG
AV VALVE AREA: 2.01 CM2
AV VELOCITY RATIO: 0.67
DOP CALC AO PEAK VEL: 1.8 M/S
DOP CALC AO VTI: 39 CM
DOP CALC LVOT AREA: 3.14 CM2
DOP CALC LVOT DIAMETER: 2 CM
DOP CALC LVOT PEAK VEL VTI: 25 CM
DOP CALC LVOT PEAK VEL: 1.2 M/S
DOP CALC LVOT STROKE VOLUME: 78.5 CM3
E WAVE DECELERATION TIME: 192 MS
E/A RATIO: 1.46
FRACTIONAL SHORTENING: 32 (ref 28–44)
INTERVENTRICULAR SEPTUM IN DIASTOLE (PARASTERNAL SHORT AXIS VIEW): 0.6 CM
INTERVENTRICULAR SEPTUM: 0.6 CM (ref 0.6–1.1)
LAAS-AP2: 19.8 CM2
LAAS-AP4: 21.3 CM2
LEFT ATRIUM SIZE: 3.7 CM
LEFT ATRIUM VOLUME (MOD BIPLANE): 63 ML
LEFT ATRIUM VOLUME INDEX (MOD BIPLANE): 27.2 ML/M2
LEFT INTERNAL DIMENSION IN SYSTOLE: 3.4 CM (ref 2.1–4)
LEFT VENTRICULAR INTERNAL DIMENSION IN DIASTOLE: 5 CM (ref 3.5–6)
LEFT VENTRICULAR POSTERIOR WALL IN END DIASTOLE: 0.7 CM
LEFT VENTRICULAR STROKE VOLUME: 71 ML
LVSV (TEICH): 71 ML
MV E'TISSUE VEL-SEP: 14 CM/S
MV PEAK A VEL: 0.67 M/S
MV PEAK E VEL: 98 CM/S
MV STENOSIS PRESSURE HALF TIME: 56 MS
MV VALVE AREA P 1/2 METHOD: 3.93
RIGHT ATRIUM AREA SYSTOLE A4C: 19.8 CM2
RIGHT VENTRICLE ID DIMENSION: 3.6 CM
SL CV LEFT ATRIUM LENGTH A2C: 5.1 CM
SL CV LV EF: 55
SL CV PED ECHO LEFT VENTRICLE DIASTOLIC VOLUME (MOD BIPLANE) 2D: 120 ML
SL CV PED ECHO LEFT VENTRICLE SYSTOLIC VOLUME (MOD BIPLANE) 2D: 49 ML
TRICUSPID ANNULAR PLANE SYSTOLIC EXCURSION: 2.6 CM

## 2023-11-09 PROCEDURE — 93306 TTE W/DOPPLER COMPLETE: CPT

## 2023-11-09 PROCEDURE — 93306 TTE W/DOPPLER COMPLETE: CPT | Performed by: INTERNAL MEDICINE

## 2023-11-09 RX ADMIN — PERFLUTREN 0.4 ML/MIN: 6.52 INJECTION, SUSPENSION INTRAVENOUS at 13:30

## 2023-11-15 RX ORDER — SODIUM CHLORIDE, SODIUM LACTATE, POTASSIUM CHLORIDE, CALCIUM CHLORIDE 600; 310; 30; 20 MG/100ML; MG/100ML; MG/100ML; MG/100ML
125 INJECTION, SOLUTION INTRAVENOUS CONTINUOUS
Status: CANCELLED | OUTPATIENT
Start: 2023-11-15

## 2023-11-16 ENCOUNTER — HOSPITAL ENCOUNTER (OUTPATIENT)
Dept: GASTROENTEROLOGY | Facility: HOSPITAL | Age: 23
Setting detail: OUTPATIENT SURGERY
End: 2023-11-16
Attending: STUDENT IN AN ORGANIZED HEALTH CARE EDUCATION/TRAINING PROGRAM
Payer: COMMERCIAL

## 2023-11-16 ENCOUNTER — ANESTHESIA (OUTPATIENT)
Dept: GASTROENTEROLOGY | Facility: HOSPITAL | Age: 23
End: 2023-11-16

## 2023-11-16 VITALS
RESPIRATION RATE: 16 BRPM | HEIGHT: 70 IN | DIASTOLIC BLOOD PRESSURE: 72 MMHG | BODY MASS INDEX: 36.79 KG/M2 | WEIGHT: 257 LBS | SYSTOLIC BLOOD PRESSURE: 116 MMHG | HEART RATE: 70 BPM | OXYGEN SATURATION: 97 % | TEMPERATURE: 97.4 F

## 2023-11-16 DIAGNOSIS — R11.10 REGURGITATION OF FOOD: ICD-10-CM

## 2023-11-16 DIAGNOSIS — K21.9 GASTROESOPHAGEAL REFLUX DISEASE, UNSPECIFIED WHETHER ESOPHAGITIS PRESENT: ICD-10-CM

## 2023-11-16 DIAGNOSIS — R11.2 NAUSEA AND VOMITING, UNSPECIFIED VOMITING TYPE: ICD-10-CM

## 2023-11-16 PROCEDURE — 88305 TISSUE EXAM BY PATHOLOGIST: CPT | Performed by: SPECIALIST

## 2023-11-16 PROCEDURE — 43239 EGD BIOPSY SINGLE/MULTIPLE: CPT | Performed by: STUDENT IN AN ORGANIZED HEALTH CARE EDUCATION/TRAINING PROGRAM

## 2023-11-16 RX ORDER — ONDANSETRON 2 MG/ML
4 INJECTION INTRAMUSCULAR; INTRAVENOUS ONCE AS NEEDED
Status: DISCONTINUED | OUTPATIENT
Start: 2023-11-16 | End: 2023-11-16 | Stop reason: HOSPADM

## 2023-11-16 RX ORDER — PROPOFOL 10 MG/ML
INJECTION, EMULSION INTRAVENOUS AS NEEDED
Status: DISCONTINUED | OUTPATIENT
Start: 2023-11-16 | End: 2023-11-16

## 2023-11-16 RX ORDER — LIDOCAINE HYDROCHLORIDE 10 MG/ML
INJECTION, SOLUTION EPIDURAL; INFILTRATION; INTRACAUDAL; PERINEURAL AS NEEDED
Status: DISCONTINUED | OUTPATIENT
Start: 2023-11-16 | End: 2023-11-16

## 2023-11-16 RX ORDER — SODIUM CHLORIDE, SODIUM LACTATE, POTASSIUM CHLORIDE, CALCIUM CHLORIDE 600; 310; 30; 20 MG/100ML; MG/100ML; MG/100ML; MG/100ML
125 INJECTION, SOLUTION INTRAVENOUS CONTINUOUS
Status: DISCONTINUED | OUTPATIENT
Start: 2023-11-16 | End: 2023-11-20 | Stop reason: HOSPADM

## 2023-11-16 RX ORDER — SUCCINYLCHOLINE/SOD CL,ISO/PF 100 MG/5ML
SYRINGE (ML) INTRAVENOUS AS NEEDED
Status: DISCONTINUED | OUTPATIENT
Start: 2023-11-16 | End: 2023-11-16

## 2023-11-16 RX ORDER — FENTANYL CITRATE 50 UG/ML
INJECTION, SOLUTION INTRAMUSCULAR; INTRAVENOUS AS NEEDED
Status: DISCONTINUED | OUTPATIENT
Start: 2023-11-16 | End: 2023-11-16

## 2023-11-16 RX ADMIN — Medication 180 MG: at 13:40

## 2023-11-16 RX ADMIN — FENTANYL CITRATE 100 MCG: 50 INJECTION, SOLUTION INTRAMUSCULAR; INTRAVENOUS at 13:39

## 2023-11-16 RX ADMIN — LIDOCAINE HYDROCHLORIDE 50 MG: 10 INJECTION, SOLUTION EPIDURAL; INFILTRATION; INTRACAUDAL; PERINEURAL at 13:39

## 2023-11-16 RX ADMIN — SODIUM CHLORIDE, SODIUM LACTATE, POTASSIUM CHLORIDE, AND CALCIUM CHLORIDE 125 ML/HR: .6; .31; .03; .02 INJECTION, SOLUTION INTRAVENOUS at 12:57

## 2023-11-16 RX ADMIN — PROPOFOL 40 MG: 10 INJECTION, EMULSION INTRAVENOUS at 13:47

## 2023-11-16 RX ADMIN — PROPOFOL 400 MG: 10 INJECTION, EMULSION INTRAVENOUS at 13:39

## 2023-11-16 NOTE — H&P
History and Physical - [facility]  Oul Kuhn 26 y.o. male MRN: [id number]                  HPI: Olu Kuhn is a 26y.o. year old male who presents for GERD and regurgitation of food. REVIEW OF SYSTEMS: Per the HPI, and otherwise unremarkable. Historical Information   Past Medical History:   Diagnosis Date    Bicuspid aortic valve     COVID-19 virus infection 08/31/2023     Past Surgical History:   Procedure Laterality Date    MOLE REMOVAL      on lip     Social History   Social History     Substance and Sexual Activity   Alcohol Use None     Social History     Substance and Sexual Activity   Drug Use Not on file     Social History     Tobacco Use   Smoking Status Never   Smokeless Tobacco Never   Tobacco Comments    Pt does Vapping     No family history on file. Meds/Allergies       Current Outpatient Medications:     loratadine (CLARITIN) 10 mg tablet    pantoprazole (PROTONIX) 40 mg tablet    sucralfate (CARAFATE) 1 g tablet    No Known Allergies    Objective     /83 (BP Location: Left arm)   Pulse 79   Temp (!) 96.5 °F (35.8 °C) (Temporal)   Resp 20   SpO2 99%       PHYSICAL EXAM    Gen: NAD  Head: NCAT  CV: RRR  CHEST: Clear  ABD: soft, NT/ND  EXT: no edema      ASSESSMENT/PLAN:  This is a 26y.o. year old male here for EGD, and he is stable and optimized for his procedure.

## 2023-11-16 NOTE — ANESTHESIA PROCEDURE NOTES
Anesthesia Notable Event    Date/Time: 11/16/2023 2:43 PM    Performed by: Griffin Hwang MD  Authorized by: Griffin Hwang MD

## 2023-11-16 NOTE — ANESTHESIA POSTPROCEDURE EVALUATION
Post-Op Assessment Note    CV Status:  Stable  Pain Score: 0    Pain management: adequate       Mental Status:  Alert and awake   Hydration Status:  Euvolemic   PONV Controlled:  Controlled   Airway Patency:  Patent     Post Op Vitals Reviewed: Yes    No anethesia notable event occurred.     Staff: CRNA               BP   153/84   Temp  97.9   Pulse  80   Resp   20   SpO2   100%

## 2023-11-16 NOTE — ANESTHESIA PREPROCEDURE EVALUATION
Procedure:  EGD    Relevant Problems   CARDIO   (+) Bicuspid aortic valve      GI/HEPATIC   (+) Gastroesophageal reflux disease   (+) Hepatic steatosis      NEURO/PSYCH   (+) Episodic cluster headache, not intractable      Respiratory   (+) Allergic rhinitis      Ate chicken at 7:00 PM on 11/15/23. Discussed with Dr. Alina Pizarro and will intubate due to concern for gastroparesis. ECHO 11/9/23: This transthoracic echocardiogram was performed in the echo lab. This was a routine, outpatient study. Study quality was poor. This was a technically difficult study due to decreased penetration. A complete 2D, color flow Doppler, spectral Doppler, 2D, color flow Doppler and spectral Doppler transthoracic echocardiogram was performed. The apical, parasternal, subcostal and suprasternal views were obtained. 0.4mL/min of Definity ultrasound enhancing agent was used due to opacify left ventricle. History    Bicuspid AV, GERD     Interpretation Summary         Left Ventricle: Left ventricular cavity size is normal. Wall thickness is normal. The left ventricular ejection fraction is 55%. Systolic function is normal. Wall motion cannot be accurately assessed. Diastolic function is normal.    Aortic Valve: The aortic valve leaflets are not visualized. The aortic valve cannot be ruled out as bicuspid. The aortic valve has no significant stenosis. The aortic valve peak velocity is 1.8 m/s. The aortic valve mean gradient is 7.0 mmHg. The dimensionless velocity index is 0.67. The aortic valve area is 2.01 cm2. The aortic valve morphology is not well visualized due to poor image quality. However, previous echocardiograms from [facility] and [facility] describe a bicuspid aortic valve. Findings    Left Ventricle Left ventricular cavity size is normal. Wall thickness is normal. The left ventricular ejection fraction is 55%.  Systolic function is normal. Wall motion cannot be accurately assessed. Diastolic function is normal.   Right Ventricle Right ventricular cavity size is normal. Systolic function is normal.   Left Atrium The atrium is normal in size. Right Atrium The atrium is normal in size. Aortic Valve The aortic valve leaflets are not visualized. The aortic valve cannot be ruled out as bicuspid. There is no evidence of regurgitation. The aortic valve has no significant stenosis. The aortic valve peak velocity is 1.8 m/s. The aortic valve mean gradient is 7.0 mmHg. The dimensionless velocity index is 0.67. The aortic valve area is 2.01 cm2. Mitral Valve Mitral valve structure is normal.  There is trace regurgitation. There is no evidence of stenosis. Tricuspid Valve There is no evidence of regurgitation. There is no evidence of stenosis. Pulmonic Valve There is no evidence of regurgitation. There is no evidence of stenosis. Ascending Aorta The aortic root is normal in size. IVC/SVC The inferior vena cava is normal in size. Respirophasic changes were normal.   Pericardium There is no pericardial effusion. The pericardium is normal in appearance. Left Ventricle Measurements    Function/Volumes   A4C EF 61 %         LVOT stroke volume 78.5 cm3         Dimensions   LVIDd 5 cm         LVIDS 3.4 cm         IVSd 0.6 cm         LVPWd 0.7 cm         LVOT area 3.14 cm2         FS 32         Diastolic Filling   MV E' Tissue Velocity Septal 14 cm/s         LA Volume Index (BP) 27.2 mL/m2         E/A ratio 1.46         E wave deceleration time 192 ms         MV Peak E Parker 98 cm/s         MV Peak A Parker 0.67 m/s               Physical Exam    Airway    Mallampati score: II  TM Distance: >3 FB  Neck ROM: full     Dental   No notable dental hx     Cardiovascular      Pulmonary      Other Findings        Anesthesia Plan  ASA Score- 3     Anesthesia Type- general with ASA Monitors. Additional Monitors:     Airway Plan: ETT.            Plan Factors-Exercise tolerance (METS): >4 METS. Chart reviewed. Patient summary reviewed. Patient is a current smoker. Patient smoked on day of surgery. Induction- intravenous. Postoperative Plan- . Planned trial extubation    Informed Consent- Anesthetic plan and risks discussed with patient. I personally reviewed this patient with the CRNA. Discussed and agreed on the Anesthesia Plan with the CRNA. Nilda Simms

## 2023-11-22 PROCEDURE — 88305 TISSUE EXAM BY PATHOLOGIST: CPT | Performed by: SPECIALIST

## 2023-12-13 ENCOUNTER — HOSPITAL ENCOUNTER (OUTPATIENT)
Dept: NUCLEAR MEDICINE | Facility: HOSPITAL | Age: 23
Discharge: HOME | End: 2023-12-13
Attending: STUDENT IN AN ORGANIZED HEALTH CARE EDUCATION/TRAINING PROGRAM
Payer: COMMERCIAL

## 2023-12-13 DIAGNOSIS — R11.2 NAUSEA AND VOMITING, UNSPECIFIED VOMITING TYPE: ICD-10-CM

## 2023-12-13 DIAGNOSIS — R11.10 REGURGITATION OF FOOD: ICD-10-CM

## 2023-12-13 PROCEDURE — G1004 CDSM NDSC: HCPCS

## 2023-12-13 PROCEDURE — A9541 TC99M SULFUR COLLOID: HCPCS

## 2023-12-13 PROCEDURE — 78264 GASTRIC EMPTYING IMG STUDY: CPT

## 2023-12-21 ENCOUNTER — OFFICE VISIT (OUTPATIENT)
Dept: GASTROENTEROLOGY | Facility: MEDICAL CENTER | Age: 23
End: 2023-12-21
Payer: COMMERCIAL

## 2023-12-21 VITALS
WEIGHT: 272.2 LBS | SYSTOLIC BLOOD PRESSURE: 127 MMHG | BODY MASS INDEX: 38.11 KG/M2 | TEMPERATURE: 96.5 F | HEART RATE: 76 BPM | DIASTOLIC BLOOD PRESSURE: 84 MMHG | OXYGEN SATURATION: 98 % | HEIGHT: 71 IN

## 2023-12-21 DIAGNOSIS — R74.01 ELEVATED ALANINE AMINOTRANSFERASE (ALT) LEVEL: ICD-10-CM

## 2023-12-21 DIAGNOSIS — K31.84 GASTROPARESIS: Primary | ICD-10-CM

## 2023-12-21 DIAGNOSIS — K21.9 GASTROESOPHAGEAL REFLUX DISEASE, UNSPECIFIED WHETHER ESOPHAGITIS PRESENT: ICD-10-CM

## 2023-12-21 DIAGNOSIS — E66.01 CLASS 2 SEVERE OBESITY DUE TO EXCESS CALORIES WITH SERIOUS COMORBIDITY AND BODY MASS INDEX (BMI) OF 37.0 TO 37.9 IN ADULT: ICD-10-CM

## 2023-12-21 PROCEDURE — 99214 OFFICE O/P EST MOD 30 MIN: CPT | Performed by: STUDENT IN AN ORGANIZED HEALTH CARE EDUCATION/TRAINING PROGRAM

## 2023-12-21 RX ORDER — PANTOPRAZOLE SODIUM 40 MG/1
40 TABLET, DELAYED RELEASE ORAL DAILY
Qty: 30 TABLET | Refills: 11 | Status: SHIPPED | OUTPATIENT
Start: 2023-12-21

## 2023-12-21 RX ORDER — SUCRALFATE 1 G/1
1 TABLET ORAL 4 TIMES DAILY PRN
Qty: 120 TABLET | Refills: 3 | Status: SHIPPED | OUTPATIENT
Start: 2023-12-21

## 2023-12-21 NOTE — PROGRESS NOTES
[facility] - Outpatient Follow-up Note  Koby Marrero 23 y.o. male MRN: [id number]  Encounter: [id number]          ASSESSMENT AND PLAN:    23-year-old male with bicuspid aortic valve, BMI 38 here for follow-up of gastroparesis and fatty liver disease.  1. Gastroparesis  -counseled on gastroparesis diet and given handout on this  -Discussed medications and gPOEM as treatment options but will hold on these for now    2. Gastroesophageal reflux disease, unspecified whether esophagitis present  Will try to wean PPI to once daily and make carafate as needed  - pantoprazole (PROTONIX) 40 mg tablet; Take 1 tablet (40 mg total) by mouth daily  Dispense: 30 tablet; Refill: 11  - sucralfate (CARAFATE) 1 g tablet; Take 1 tablet (1 g total) by mouth 4 (four) times a day as needed (abdominal pain or nausea)  Dispense: 120 tablet; Refill: 3    3. Elevated alanine aminotransferase (ALT) level  4. Class 2 severe obesity due to excess calories with serious comorbidity and body mass index (BMI) of 37.0 to 37.9 in adult   US with hepatic steatosis and ALT elevated. Discussed natural history of NAFLD and importance of weight loss.  He and his mother would like to lose weight on their own as a new years resolution. Offered weight management referral but will hold on this for now.      ______________________________________________________________________    SUBJECTIVE:    Trying to eat small frequent meals. Having less regurgitation but still happens.  Taking pantoprazole 40 mg twice daily, has not missed any doses so not sure what it changes.    Weight is stable.    EGD 11/16/23  The esophagus appeared normal. Z-line is 39 cm from the incisors. Performed random biopsy using biopsy forceps to rule out eosinophilic esophagitis.  The stomach appeared normal. Performed random biopsy using biopsy forceps to rule out H. pylori.  The duodenal bulb and 2nd part of the duodenum appeared normal.   A. Stomach, BX R/O  "H-Pylori:  -  Chronic inactive oxyntic and antral gastritis.  -  Negative for Helicobacter pylori, by H&E stain.  -  Negative for intestinal metaplasia, dysplasia or carcinoma.   B. Esophagus, BX R/O EOE:  -  Squamous mucosa with non specific reactive changes.  -  Intraepithelial eosinophils are 2/HPF.  -  Negative for dysplasia or malignancy.    GES 12/13/23  Gastric emptying at 0.5 hours = 8% (N < 30%)  Gastric emptying at 1 hour = 16% (N = 10 - 70%)  Gastric emptying at 2 hours = 32% (N = > 40%)  Gastric emptying at 3 hours = 47% (N = > 70%)  Gastric emptying at 4 hours = 66% (N = >90%)     Linear T-1/2 = 191 minutes    10/23/23 RUQ US  IMPRESSION:  Hepatic steatosis.      REVIEW OF SYSTEMS IS OTHERWISE NEGATIVE.      Historical Information   Past Medical History:   Diagnosis Date    Bicuspid aortic valve     COVID-19 virus infection 08/31/2023     Past Surgical History:   Procedure Laterality Date    MOLE REMOVAL      on lip     Social History   Social History     Substance and Sexual Activity   Alcohol Use None     Social History     Substance and Sexual Activity   Drug Use Not on file     Social History     Tobacco Use   Smoking Status Never   Smokeless Tobacco Never   Tobacco Comments    Pt does Vapping     No family history on file.    Meds/Allergies       Current Outpatient Medications:     loratadine (CLARITIN) 10 mg tablet    pantoprazole (PROTONIX) 40 mg tablet    sucralfate (CARAFATE) 1 g tablet    No Known Allergies        Objective   /84 (BP Location: Right arm)   Pulse 76   Temp (!) 96.5 °F (35.8 °C)   Ht 5' 11\" (1.803 m)   Wt 123 kg (272 lb 3.2 oz)   SpO2 98%   BMI 37.96 kg/m²       PHYSICAL EXAM:      General Appearance:   Alert, cooperative, no distress   HEENT:   Normocephalic, atraumatic, anicteric.     Neck:  Supple, symmetrical, trachea midline   Lungs:   Clear to auscultation bilaterally; no rales, rhonchi or wheezing; respirations unlabored    Heart::   Regular rate and rhythm; " no murmur, rub, or gallop.   Abdomen:   Soft, non-tender, non-distended; normal bowel sounds; no masses, no organomegaly    Genitalia:   Deferred    Rectal:   Deferred    Extremities:  No cyanosis, clubbing or edema    Pulses:  2+ and symmetric    Skin:  No jaundice, rashes, or lesions    Lymph nodes:  No palpable cervical lymphadenopathy        Lab Results:   No visits with results within 1 Day(s) from this visit.   Latest known visit with results is:   Hospital Outpatient Visit on 11/16/2023   Component Date Value    Case Report 11/16/2023                      Value:Surgical Pathology Report                         Case: [id number]                                   Authorizing Provider:  Marguerite Rendon MD          Collected:           11/16/2023 1346              Ordering Location:     [facility] Received:            11/16/2023 1416                                     Heart Endoscopy                                                              Pathologist:           Lauro Rodriguez MD                                                     Specimens:   A) - Stomach, BX R/O H-Pylori                                                                       B) - Esophagus, BX R/O EOE                                                                 Final Diagnosis 11/16/2023                      Value:This result contains rich text formatting which cannot be displayed here.    Additional Information 11/16/2023                      Value:This result contains rich text formatting which cannot be displayed here.    Gross Description 11/16/2023                      Value:This result contains rich text formatting which cannot be displayed here.         Radiology Results:   NM gastric emptying    Result Date: 12/13/2023  Narrative: GASTRIC EMPTYING STUDY INDICATION: R11.10: Vomiting, unspecified R11.2: Nausea with vomiting, unspecified COMPARISON:  None available TECHNIQUE:   The study was performed following  the oral administration of 1.1 mCi Tc-99m sulfur colloid combined with scrambled eggs, as part of a standard meal.  Following the meal, one minute anterior and posterior images were obtained immediately and at 0.25 hours, 0.5 hour, 1 hour, 1.5 hour, 2 hour, 3 hour and 4 hour intervals from the time of ingestion.  Geometric mean analyses were then performed. FINDINGS: Gastric emptying at 0.5 hours = 8% (N < 30%) Gastric emptying at 1 hour = 16% (N = 10 - 70%) Gastric emptying at 2 hours = 32% (N = > 40%) Gastric emptying at 3 hours = 47% (N = > 70%) Gastric emptying at 4 hours = 66% (N = >90%) Linear T-1/2 = 191 minutes     Impression: Moderately delayed rate of gastric emptying. Workstation performed: [id number]

## 2024-02-08 ENCOUNTER — OFFICE VISIT (OUTPATIENT)
Dept: FAMILY MEDICINE CLINIC | Facility: CLINIC | Age: 24
End: 2024-02-08
Payer: COMMERCIAL

## 2024-02-08 VITALS
TEMPERATURE: 97.1 F | OXYGEN SATURATION: 98 % | WEIGHT: 279.5 LBS | DIASTOLIC BLOOD PRESSURE: 80 MMHG | SYSTOLIC BLOOD PRESSURE: 112 MMHG | HEART RATE: 101 BPM | BODY MASS INDEX: 38.98 KG/M2

## 2024-02-08 DIAGNOSIS — K21.9 GASTROESOPHAGEAL REFLUX DISEASE, UNSPECIFIED WHETHER ESOPHAGITIS PRESENT: ICD-10-CM

## 2024-02-08 DIAGNOSIS — K31.84 GASTROPARESIS: Primary | ICD-10-CM

## 2024-02-08 DIAGNOSIS — Q23.1 BICUSPID AORTIC VALVE: ICD-10-CM

## 2024-02-08 PROCEDURE — 99214 OFFICE O/P EST MOD 30 MIN: CPT | Performed by: NURSE PRACTITIONER

## 2024-02-08 RX ORDER — PANTOPRAZOLE SODIUM 40 MG/1
40 TABLET, DELAYED RELEASE ORAL DAILY
Qty: 30 TABLET | Refills: 11 | Status: CANCELLED | OUTPATIENT
Start: 2024-02-08

## 2024-02-08 NOTE — PROGRESS NOTES
Name: Koby Marrero      : 2000      MRN: [id number]  Encounter Provider: STEPHANIE Keller  Encounter Date: 2024   Encounter department: [facility]    Assessment & Plan     1. Gastroparesis  Assessment & Plan:  Patient symptoms are manageable on current medication regimen and diet.  Patient does have follow-up with GI scheduled for May 2024.      2. Gastroesophageal reflux disease, unspecified whether esophagitis present  Assessment & Plan:  Well-controlled on current regimen.      3. Bicuspid aortic valve  Assessment & Plan:  Patient's symptoms are currently stable.  Patient does have upcoming follow-up scheduled with [facility] cardiology.             Subjective      Gastroparesis/GERD: Patient continues to follow with GI regarding this.  Patient did have an EGD completed in 2023 which was normal.  Patient then had a gastric emptying study completed in 2023 which did show moderately delayed rate of gastric emptying.  Per GIs most recent note the patient was not interested in taking medication specifically for his gastroparesis but he continues to be managed on Protonix 40 mg daily and Carafate 1 g 4 times daily as needed.  Patient was also advised to eat 5-6 smaller meals/snacks throughout the day instead of 3 large meals and to avoid foods high in fat and fiber in his diet.  The patient reports he has been following the recommended diet changes and that his symptoms have been stable.  He does report some intermittent episodes of reflux when eating larger meals but states that he has not had any episodes recently where he vomits or feels as though he is going to vomit after eating.  Patient would like to continue his current medication regimen at least until he follows up with GI in a few months.    Bicuspid aortic valve: Patient did have an echocardiogram completed in 2023 which did show an EF of 55% and it was also noted that bicuspid aortic valve could not  be ruled out.  No other acute cardiac abnormalities were noted.  Patient does have an upcoming follow-up scheduled with [facility] cardiology regarding this.  Patient denies any current cardiac symptoms.          Review of Systems   Constitutional:  Negative for chills and fever.   HENT:  Negative for ear pain and sore throat.    Eyes:  Negative for pain and visual disturbance.   Respiratory:  Negative for cough, chest tightness, shortness of breath and wheezing.    Cardiovascular:  Negative for chest pain, palpitations and leg swelling.   Gastrointestinal:  Negative for abdominal pain, constipation, diarrhea, nausea and vomiting.   Endocrine: Negative for cold intolerance and heat intolerance.   Genitourinary:  Negative for decreased urine volume, dysuria and hematuria.   Musculoskeletal:  Negative for arthralgias, back pain and myalgias.   Skin:  Negative for color change and rash.   Allergic/Immunologic: Negative for environmental allergies.   Neurological:  Negative for dizziness, seizures, syncope, weakness, light-headedness, numbness and headaches.   Hematological:  Negative for adenopathy.   Psychiatric/Behavioral:  Negative for confusion. The patient is not nervous/anxious.    All other systems reviewed and are negative.      Current Outpatient Medications on File Prior to Visit   Medication Sig   • loratadine (CLARITIN) 10 mg tablet Take 10 mg by mouth daily   • pantoprazole (PROTONIX) 40 mg tablet Take 1 tablet (40 mg total) by mouth daily   • sucralfate (CARAFATE) 1 g tablet Take 1 tablet (1 g total) by mouth 4 (four) times a day as needed (abdominal pain or nausea)       Objective     /80 (BP Location: Right arm, Patient Position: Sitting, Cuff Size: Large)   Pulse 101   Temp (!) 97.1 °F (36.2 °C) (Temporal)   Wt 127 kg (279 lb 8 oz)   SpO2 98%   BMI 38.98 kg/m²     Physical Exam  Vitals and nursing note reviewed.   Constitutional:       General: He is not in acute distress.     Appearance: Normal  appearance. He is not ill-appearing.   HENT:      Head: Normocephalic.   Eyes:      Conjunctiva/sclera: Conjunctivae normal.   Cardiovascular:      Rate and Rhythm: Normal rate and regular rhythm.      Pulses: Normal pulses.           Carotid pulses are 2+ on the right side and 2+ on the left side.       Radial pulses are 2+ on the right side and 2+ on the left side.        Posterior tibial pulses are 2+ on the right side and 2+ on the left side.      Heart sounds: Normal heart sounds. No murmur heard.  Pulmonary:      Effort: Pulmonary effort is normal. No respiratory distress.      Breath sounds: Normal breath sounds. No decreased breath sounds, wheezing, rhonchi or rales.   Abdominal:      General: Abdomen is flat. Bowel sounds are normal. There is no distension.      Palpations: Abdomen is soft.      Tenderness: There is no abdominal tenderness. There is no guarding.   Musculoskeletal:         General: Normal range of motion.      Cervical back: Normal range of motion.      Right lower leg: No edema.      Left lower leg: No edema.   Skin:     General: Skin is warm and dry.      Capillary Refill: Capillary refill takes less than 2 seconds.   Neurological:      General: No focal deficit present.      Mental Status: He is alert and oriented to person, place, and time.   Psychiatric:         Mood and Affect: Mood normal.         Behavior: Behavior normal.         Thought Content: Thought content normal.         Judgment: Judgment normal.       STEPHANIE Keller

## 2024-02-08 NOTE — ASSESSMENT & PLAN NOTE
Patient symptoms are manageable on current medication regimen and diet.  Patient does have follow-up with GI scheduled for May 2024.

## 2024-02-08 NOTE — ASSESSMENT & PLAN NOTE
Patient's symptoms are currently stable.  Patient does have upcoming follow-up scheduled with [facility] cardiology.

## 2024-06-20 ENCOUNTER — TELEPHONE (OUTPATIENT)
Dept: GASTROENTEROLOGY | Facility: MEDICAL CENTER | Age: 24
End: 2024-06-20

## 2024-10-07 ENCOUNTER — OFFICE VISIT (OUTPATIENT)
Dept: URGENT CARE | Age: 24
End: 2024-10-07
Payer: COMMERCIAL

## 2024-10-07 VITALS
RESPIRATION RATE: 18 BRPM | BODY MASS INDEX: 35.19 KG/M2 | HEIGHT: 70 IN | DIASTOLIC BLOOD PRESSURE: 70 MMHG | OXYGEN SATURATION: 98 % | SYSTOLIC BLOOD PRESSURE: 118 MMHG | TEMPERATURE: 97.8 F | HEART RATE: 78 BPM | WEIGHT: 245.8 LBS

## 2024-10-07 DIAGNOSIS — R11.2 NAUSEA AND VOMITING, UNSPECIFIED VOMITING TYPE: Primary | ICD-10-CM

## 2024-10-07 PROCEDURE — 99213 OFFICE O/P EST LOW 20 MIN: CPT | Performed by: PREVENTIVE MEDICINE

## 2024-10-07 NOTE — PROGRESS NOTES
[facility]    NAME: Koby Marrero is a 24 y.o. male  : 2000    MRN: [id number]  DATE: 2024  TIME: 11:43 AM    Assessment and Plan   Nausea and vomiting, unspecified vomiting type [R11.2]  1. Nausea and vomiting, unspecified vomiting type        Supportive care as discussed. Hydration and rest. BRAT diet recommended when starting to introduce foods, then gradually introduce more as you are able to tolerate. Pedialyte recommended. Work note provided. ED precautions reviewed such as worsening symptoms or lightheadedness. Follow up with GI as well. Patient agreeable.   Patient Instructions     Follow up with PCP in 3-5 days.  Proceed to ER if symptoms worsen.    If tests have been performed at [facility], our office will contact you with results if changes need to be made to the care plan discussed with you at the visit.  You can review your full results on [facility].    Chief Complaint     Chief Complaint   Patient presents with    Vomiting     Patient reports at around 8am this morning he vomited (4xs) and had a headache. Patient states that he feels better as of now.      History of Present Illness     Patient is a 24-year-old male with PMH of gastroparesis presenting complaining of nausea and vomiting 4x since this morning. He notes he also had a headache but not his symptoms have started to resolve.   He has tried nothing for his symptoms.   He states he has not tried eating yet today, but has been drinking water and keeping it down.    Vomiting   Pertinent negatives include no abdominal pain, chills, diarrhea or fever.       Review of Systems   Review of Systems   Constitutional:  Negative for chills and fever.   HENT: Negative.     Eyes: Negative.    Respiratory:  Negative for shortness of breath, wheezing and stridor.    Gastrointestinal:  Positive for nausea and vomiting. Negative for abdominal pain, blood in stool and diarrhea.   Genitourinary: Negative.    Musculoskeletal:  "Negative.    Skin: Negative.    Neurological: Negative.        Current Medications       Current Outpatient Medications:     loratadine (CLARITIN) 10 mg tablet, Take 10 mg by mouth daily, Disp: , Rfl:     pantoprazole (PROTONIX) 40 mg tablet, Take 1 tablet (40 mg total) by mouth daily, Disp: 30 tablet, Rfl: 11    sucralfate (CARAFATE) 1 g tablet, Take 1 tablet (1 g total) by mouth 4 (four) times a day as needed (abdominal pain or nausea), Disp: 120 tablet, Rfl: 3    Current Allergies     Allergies as of 10/07/2024    (No Known Allergies)            The following portions of the patient's history were reviewed and updated as appropriate: allergies, current medications, past family history, past medical history, past social history, past surgical history and problem list.     Past Medical History:   Diagnosis Date    Bicuspid aortic valve     COVID-19 virus infection 08/31/2023       Past Surgical History:   Procedure Laterality Date    MOLE REMOVAL      on lip       No family history on file.      Medications have been verified.        Objective   /70   Pulse 78   Temp 97.8 °F (36.6 °C) (Tympanic)   Resp 18   Ht 5' 10\" (1.778 m)   Wt 111 kg (245 lb 12.8 oz)   SpO2 98%   BMI 35.27 kg/m²   No LMP for male patient.       Physical Exam     Physical Exam  Vitals and nursing note reviewed.   Constitutional:       General: He is not in acute distress.     Appearance: Normal appearance. He is normal weight. He is not ill-appearing, toxic-appearing or diaphoretic.   HENT:      Head: Normocephalic and atraumatic.      Right Ear: External ear normal.      Left Ear: External ear normal.      Nose: Nose normal.      Mouth/Throat:      Mouth: Mucous membranes are moist.      Pharynx: Oropharynx is clear.   Eyes:      Conjunctiva/sclera: Conjunctivae normal.   Cardiovascular:      Pulses: Normal pulses.      Heart sounds: Normal heart sounds. No murmur heard.     No friction rub. No gallop.   Pulmonary:      Effort: " Pulmonary effort is normal. No respiratory distress.      Breath sounds: Normal breath sounds. No stridor. No wheezing, rhonchi or rales.   Chest:      Chest wall: No tenderness.   Abdominal:      General: Abdomen is flat. There is no distension.      Palpations: Abdomen is soft. There is no mass.      Tenderness: There is no abdominal tenderness. There is no right CVA tenderness, left CVA tenderness, guarding or rebound.      Hernia: No hernia is present.   Musculoskeletal:         General: Normal range of motion.   Skin:     General: Skin is warm and dry.      Capillary Refill: Capillary refill takes less than 2 seconds.   Neurological:      General: No focal deficit present.      Mental Status: He is alert. Mental status is at baseline.   Psychiatric:         Mood and Affect: Mood normal.

## 2024-10-07 NOTE — LETTER
October 7, 2024     Patient: Koby Marrero   YOB: 2000   Date of Visit: 10/7/2024       To Whom it May Concern:    Koby Marrero was seen in my clinic on 10/7/2024. He may return to work on 10/8/2024  or sooner if fever free and feeling better.    If you have any questions or concerns, please don't hesitate to call.         Sincerely,          Valerie Escamilla PA-C        CC: No Recipients

## 2024-10-07 NOTE — PATIENT INSTRUCTIONS
"Patient Education     Viral gastroenteritis in adults   The Basics   Written by the doctors and editors at [facility]   What is viral gastroenteritis? -- Viral gastroenteritis is an infection that can cause diarrhea and vomiting. It happens when a person's stomach and intestines get infected with a virus (figure 1). One of the most common causes of gastroenteritis is norovirus. But other viruses can cause it, too.  People can get viral gastroenteritis if they:   Touch an infected person or a surface with the virus on it, and then don't wash their hands   Eat foods or drink liquids with the virus in them. If people with the virus don't wash their hands, they can spread it to food or liquids they touch.  What are the symptoms of viral gastroenteritis? -- The infection causes diarrhea and vomiting. People can have either diarrhea or vomiting, or both. These symptoms usually start suddenly, and can be severe.  Viral gastroenteritis can also cause:   Fever   Headache or muscle aches   Belly pain or cramping   Loss of appetite  If you have a lot of diarrhea and vomiting, your body can lose too much water. This is known as \"dehydration.\" Dehydration can make you feel thirsty, tired, dizzy, or even confused. It can also make your urine look dark yellow.  Severe dehydration can be life-threatening. Older people are more likely to get severe dehydration.  Will I need tests? -- Not usually. Your doctor or nurse should be able to tell if you have viral gastroenteritis by learning about your symptoms and doing an exam. But the doctor or nurse might do tests to check for dehydration or to see which virus is causing the infection. These tests can include:   Blood tests   Urine tests   Tests on a sample of bowel movement  Is there anything I can do on my own to feel better? -- Yes. You need to replace the body's fluids that are lost through vomiting and diarrhea:   Drink fluids when you are able. It might help to take small sips " "every 15 to 30 minutes. Try to drink more as you start to feel better.   When you have a lot of vomiting or diarrhea, your body loses both water and salt. Drinking fluids that contain some salt can help replace what your body has lost. Examples include \"oral rehydration solutions,\" sports drinks, and broth. If you drink a lot of plain water, make sure you are also eating. This will help your body keep the right salt and water balance.   Avoid drinks with a lot of sugar, like juice or soda. Avoid alcohol, too.   Eat when you are able. If you can keep food down, it's best to eat lean meats, fruits, vegetables, and whole-grain breads and cereals. Avoid eating foods with a lot of fat or sugar, which can make symptoms worse.  If you are an adult younger than 65 and you have a new bout of diarrhea, and no fever and no blood in your bowel movements, you can take medicine to stop diarrhea such as loperamide (brand name: Imodium) for 1 to 2 days. But if you are older than 65, have a fever, or have blood in your bowel movements, do not take these medicines without checking with your doctor.  If you have diabetes, you might need to check your blood sugar more often until you feel better. Ask your doctor or nurse about this.  Should I call the doctor or nurse? -- Call the doctor or nurse if you:   Have any symptoms of dehydration, like feeling very tired, thirsty, dizzy, or confused   Have diarrhea or vomiting that lasts longer than a few days   Vomit up blood, have bloody diarrhea, or have severe belly pain   Haven't been able to drink anything for many hours   Haven't needed to urinate in the past 6 to 8 hours (during the day)  How is viral gastroenteritis treated? -- Most people do not need any treatment, because their symptoms will get better on their own. But people with severe dehydration might need treatment in the hospital. This involves getting fluids through a thin tube that goes into a vein, called an \"IV.\"  Doctors " "do not treat viral gastroenteritis with antibiotics. That's because antibiotics treat infections that are caused by bacteria, not viruses.  Can viral gastroenteritis be prevented? -- Sometimes. To lower the chance of getting or spreading the infection, wash your hands well with soap and water:   After you use the bathroom   Before you eat   Before you prepare food  Also, if you are caring for a child in diapers, wash your hands well after changing diapers. Do not change diapers near where you cook or eat food.  All topics are updated as new evidence becomes available and our peer review process is complete.  This topic retrieved from [employer] on: Mar 06, 2024.  Topic 02903 Version 17.0  Release: 32.2.4 - C32.64  © 2024 UpToDate, Inc. and/or its affiliates. All rights reserved.  figure 1: Digestive system     This drawing shows the organs in the body that process food. Together, these organs are called the \"digestive system\" or \"digestive tract.\" As food travels through this system, the body absorbs nutrients and water.  Graphic 71042 Version 5.0  Consumer Information Use and Disclaimer   Disclaimer: This generalized information is a limited summary of diagnosis, treatment, and/or medication information. It is not meant to be comprehensive and should be used as a tool to help the user understand and/or assess potential diagnostic and treatment options. It does NOT include all information about conditions, treatments, medications, side effects, or risks that may apply to a specific patient. It is not intended to be medical advice or a substitute for the medical advice, diagnosis, or treatment of a health care provider based on the health care provider's examination and assessment of a patient's specific and unique circumstances. Patients must speak with a health care provider for complete information about their health, medical questions, and treatment options, including any risks or benefits regarding use of " medications. This information does not endorse any treatments or medications as safe, effective, or approved for treating a specific patient. UpToDate, Inc. and its affiliates disclaim any warranty or liability relating to this information or the use thereof.The use of this information is governed by the Terms of Use, available at https://www.[employer].com/en/know/clinical-effectiveness-terms. 2024© UpToDate, Inc. and its affiliates and/or licensors. All rights reserved.  Copyright   © 2024 UpToDate, Inc. and/or its affiliates. All rights reserved.

## 2025-02-02 DIAGNOSIS — K21.9 GASTROESOPHAGEAL REFLUX DISEASE, UNSPECIFIED WHETHER ESOPHAGITIS PRESENT: ICD-10-CM

## 2025-02-04 RX ORDER — SUCRALFATE 1 G/1
1 TABLET ORAL 4 TIMES DAILY PRN
Qty: 120 TABLET | Refills: 0 | Status: SHIPPED | OUTPATIENT
Start: 2025-02-04 | End: 2025-02-13

## 2025-02-04 NOTE — TELEPHONE ENCOUNTER
Patient called the refill line for this medication. Informed patient that the medication was sent to the pharmacy this morning.

## 2025-02-13 ENCOUNTER — OFFICE VISIT (OUTPATIENT)
Dept: GASTROENTEROLOGY | Facility: MEDICAL CENTER | Age: 25
End: 2025-02-13
Payer: COMMERCIAL

## 2025-02-13 VITALS
SYSTOLIC BLOOD PRESSURE: 115 MMHG | HEART RATE: 83 BPM | BODY MASS INDEX: 35.82 KG/M2 | HEIGHT: 70 IN | OXYGEN SATURATION: 98 % | WEIGHT: 250.2 LBS | DIASTOLIC BLOOD PRESSURE: 69 MMHG | TEMPERATURE: 98.6 F

## 2025-02-13 DIAGNOSIS — K31.84 GASTROPARESIS: Primary | ICD-10-CM

## 2025-02-13 DIAGNOSIS — K21.9 GASTROESOPHAGEAL REFLUX DISEASE, UNSPECIFIED WHETHER ESOPHAGITIS PRESENT: ICD-10-CM

## 2025-02-13 DIAGNOSIS — E66.812 CLASS 2 OBESITY DUE TO EXCESS CALORIES WITHOUT SERIOUS COMORBIDITY WITH BODY MASS INDEX (BMI) OF 35.0 TO 35.9 IN ADULT: ICD-10-CM

## 2025-02-13 DIAGNOSIS — E66.09 CLASS 2 OBESITY DUE TO EXCESS CALORIES WITHOUT SERIOUS COMORBIDITY WITH BODY MASS INDEX (BMI) OF 35.0 TO 35.9 IN ADULT: ICD-10-CM

## 2025-02-13 DIAGNOSIS — K76.0 FATTY LIVER: ICD-10-CM

## 2025-02-13 PROCEDURE — 99214 OFFICE O/P EST MOD 30 MIN: CPT | Performed by: STUDENT IN AN ORGANIZED HEALTH CARE EDUCATION/TRAINING PROGRAM

## 2025-02-13 RX ORDER — SUCRALFATE 1 G/1
1 TABLET ORAL DAILY PRN
Qty: 90 TABLET | Refills: 3 | Status: SHIPPED | OUTPATIENT
Start: 2025-02-13 | End: 2025-02-13

## 2025-02-13 RX ORDER — SUCRALFATE 1 G/1
1 TABLET ORAL 3 TIMES DAILY PRN
Qty: 90 TABLET | Refills: 11 | Status: SHIPPED | OUTPATIENT
Start: 2025-02-13

## 2025-02-13 NOTE — ASSESSMENT & PLAN NOTE
12/13/2023 gastric emptying study with 66% emptying at 4 hours.  He feels his symptoms are improved with carafate but not PPI.  Discussed that it is not clear why carafate would help his reflux symptoms but acosta that this has been helping, will continue.  Will also refer to nutrition as I suspect a large part of his symptoms could be controlled with dietary modification.  Orders:    Ambulatory Referral to Nutrition Services; Future    sucralfate (CARAFATE) 1 g tablet; Take 1 tablet (1 g total) by mouth 3 (three) times a day as needed (abdominal pain)

## 2025-02-13 NOTE — ASSESSMENT & PLAN NOTE
EGD 11/16/2023 unremarkable.  Gastric biopsies with chronic inactive gastritis.  Esophageal biopsies negative for EOE.  Orders:    sucralfate (CARAFATE) 1 g tablet; Take 1 tablet (1 g total) by mouth 3 (three) times a day as needed (abdominal pain)

## 2025-02-13 NOTE — PROGRESS NOTES
Name: Koby Marrero      : 2000      MRN: [id number]  Encounter Provider: Marguerite Rendon MD  Encounter Date: 2025   Encounter department: [facility]  :  Assessment & Plan  Gastroparesis  2023 gastric emptying study with 66% emptying at 4 hours.  He feels his symptoms are improved with carafate but not PPI.  Discussed that it is not clear why carafate would help his reflux symptoms but givne that this has been helping, will continue.  Will also refer to nutrition as I suspect a large part of his symptoms could be controlled with dietary modification.  Orders:    Ambulatory Referral to Nutrition Services; Future    sucralfate (CARAFATE) 1 g tablet; Take 1 tablet (1 g total) by mouth 3 (three) times a day as needed (abdominal pain)    Gastroesophageal reflux disease, unspecified whether esophagitis present  EGD 2023 unremarkable.  Gastric biopsies with chronic inactive gastritis.  Esophageal biopsies negative for EOE.  Orders:    sucralfate (CARAFATE) 1 g tablet; Take 1 tablet (1 g total) by mouth 3 (three) times a day as needed (abdominal pain)    Class 2 obesity due to excess calories without serious comorbidity with body mass index (BMI) of 35.0 to 35.9 in adult  Lost approximately 20# when GI issues uncontrolled, weight now stabilized.  Orders:    Ambulatory Referral to Nutrition Services; Future    Hepatic function panel; Future    Fatty liver  ALT elevated and US with steatosis.  Will monitor LFTs. Encouraged gradual sustained weight loss through diet and physical activity  Orders:    Hepatic function panel; Future        History of Present Illness   HPI  Koby Marrero is a 24 y.o. male who presents for follow up of gastroparesis    Has not entirely been follow gastroparesis diet.  Avoiding red sauce and spicy foods.  Symptoms improve with eating smaller meals. Feels like he's not hungry and has to eat.  Weight is fluctuating but has been stable  "around 240-250.    Saw ENT because mom wants him off medications.  Has stopped PPI and using carafate once a day.  Feels like carafate really helps him. Doesn't remember what pantoprazole did    Barium swallow 1/24/25  1.  Normal esophageal anatomy.   2.  Gastroesophageal reflux observed.         Review of Systems   Constitutional:  Negative for chills and fever.   HENT:  Negative for ear pain and sore throat.    Eyes:  Negative for pain and visual disturbance.   Respiratory:  Negative for cough and shortness of breath.    Cardiovascular:  Negative for chest pain and palpitations.   Gastrointestinal:  Negative for abdominal pain and vomiting.   Genitourinary:  Negative for dysuria and hematuria.   Musculoskeletal:  Negative for arthralgias and back pain.   Skin:  Negative for color change and rash.   Neurological:  Negative for seizures and syncope.   All other systems reviewed and are negative.         Objective   /69 (BP Location: Right arm, Patient Position: Sitting, Cuff Size: Large)   Pulse 83   Temp 98.6 °F (37 °C) (Tympanic)   Ht 5' 10\" (1.778 m)   Wt 113 kg (250 lb 3.2 oz)   SpO2 98%   BMI 35.90 kg/m²      Physical Exam  Vitals and nursing note reviewed.   Constitutional:       General: He is not in acute distress.     Appearance: He is well-developed.   HENT:      Head: Normocephalic and atraumatic.   Eyes:      Conjunctiva/sclera: Conjunctivae normal.   Cardiovascular:      Rate and Rhythm: Normal rate and regular rhythm.      Heart sounds: No murmur heard.  Pulmonary:      Effort: Pulmonary effort is normal. No respiratory distress.      Breath sounds: Normal breath sounds.   Abdominal:      Palpations: Abdomen is soft.      Tenderness: There is no abdominal tenderness.   Musculoskeletal:         General: No swelling.      Cervical back: Neck supple.   Skin:     General: Skin is warm and dry.      Capillary Refill: Capillary refill takes less than 2 seconds.   Neurological:      Mental Status: " He is alert.   Psychiatric:         Mood and Affect: Mood normal.

## 2025-07-31 ENCOUNTER — OFFICE VISIT (OUTPATIENT)
Dept: FAMILY MEDICINE CLINIC | Facility: CLINIC | Age: 25
End: 2025-07-31
Payer: COMMERCIAL

## 2025-07-31 VITALS
DIASTOLIC BLOOD PRESSURE: 82 MMHG | HEART RATE: 71 BPM | WEIGHT: 259 LBS | BODY MASS INDEX: 37.08 KG/M2 | OXYGEN SATURATION: 98 % | SYSTOLIC BLOOD PRESSURE: 128 MMHG | HEIGHT: 70 IN

## 2025-07-31 DIAGNOSIS — Q23.81 BICUSPID AORTIC VALVE: ICD-10-CM

## 2025-07-31 DIAGNOSIS — Z71.1 CONCERN ABOUT SKIN DISEASE WITHOUT DIAGNOSIS: Primary | ICD-10-CM

## 2025-07-31 PROCEDURE — 99214 OFFICE O/P EST MOD 30 MIN: CPT | Performed by: NURSE PRACTITIONER
